# Patient Record
Sex: MALE | Race: WHITE | NOT HISPANIC OR LATINO | Employment: UNEMPLOYED | URBAN - METROPOLITAN AREA
[De-identification: names, ages, dates, MRNs, and addresses within clinical notes are randomized per-mention and may not be internally consistent; named-entity substitution may affect disease eponyms.]

---

## 2022-03-08 ENCOUNTER — APPOINTMENT (INPATIENT)
Dept: CT IMAGING | Facility: HOSPITAL | Age: 32
DRG: 897 | End: 2022-03-08
Payer: COMMERCIAL

## 2022-03-08 ENCOUNTER — HOSPITAL ENCOUNTER (INPATIENT)
Facility: HOSPITAL | Age: 32
LOS: 3 days | Discharge: HOME | DRG: 897 | End: 2022-03-11
Attending: EMERGENCY MEDICINE | Admitting: EMERGENCY MEDICINE
Payer: COMMERCIAL

## 2022-03-08 ENCOUNTER — APPOINTMENT (EMERGENCY)
Dept: CT IMAGING | Facility: HOSPITAL | Age: 32
End: 2022-03-08
Payer: COMMERCIAL

## 2022-03-08 ENCOUNTER — APPOINTMENT (EMERGENCY)
Dept: RADIOLOGY | Facility: HOSPITAL | Age: 32
End: 2022-03-08
Payer: COMMERCIAL

## 2022-03-08 ENCOUNTER — HOSPITAL ENCOUNTER (EMERGENCY)
Facility: HOSPITAL | Age: 32
Discharge: OTHER INSTITUTION | End: 2022-03-08
Attending: EMERGENCY MEDICINE | Admitting: EMERGENCY MEDICINE
Payer: COMMERCIAL

## 2022-03-08 VITALS
TEMPERATURE: 97.8 F | HEART RATE: 119 BPM | RESPIRATION RATE: 24 BRPM | WEIGHT: 153.88 LBS | DIASTOLIC BLOOD PRESSURE: 86 MMHG | OXYGEN SATURATION: 96 % | SYSTOLIC BLOOD PRESSURE: 148 MMHG

## 2022-03-08 DIAGNOSIS — F10.20 ALCOHOL USE DISORDER, SEVERE, DEPENDENCE (HCC): ICD-10-CM

## 2022-03-08 DIAGNOSIS — R56.9 SEIZURE (HCC): Primary | ICD-10-CM

## 2022-03-08 DIAGNOSIS — R74.01 TRANSAMINITIS: ICD-10-CM

## 2022-03-08 DIAGNOSIS — F10.231 DELIRIUM TREMENS (HCC): Primary | ICD-10-CM

## 2022-03-08 DIAGNOSIS — R56.9 ALCOHOL WITHDRAWAL SEIZURE (HCC): ICD-10-CM

## 2022-03-08 DIAGNOSIS — E87.2 LACTIC ACIDOSIS: ICD-10-CM

## 2022-03-08 DIAGNOSIS — F10.239 ALCOHOL WITHDRAWAL SEIZURE (HCC): ICD-10-CM

## 2022-03-08 PROBLEM — F32.9 MAJOR DEPRESSIVE DISORDER: Status: ACTIVE | Noted: 2022-03-08

## 2022-03-08 PROBLEM — R74.8 ABNORMAL SERUM LEVEL OF LIPASE: Status: ACTIVE | Noted: 2022-03-08

## 2022-03-08 LAB
2HR DELTA HS TROPONIN: 2 NG/L
ALBUMIN SERPL BCP-MCNC: 4.1 G/DL (ref 3.5–5)
ALBUMIN SERPL BCP-MCNC: 4.2 G/DL (ref 3–5.2)
ALP SERPL-CCNC: 118 U/L (ref 43–122)
ALP SERPL-CCNC: 153 U/L (ref 46–116)
ALT SERPL W P-5'-P-CCNC: 263 U/L
ALT SERPL W P-5'-P-CCNC: 358 U/L (ref 12–78)
AMPHETAMINES SERPL QL SCN: NEGATIVE
ANION GAP SERPL CALCULATED.3IONS-SCNC: 30 MMOL/L (ref 4–13)
ANION GAP SERPL CALCULATED.3IONS-SCNC: 8 MMOL/L (ref 5–14)
APAP SERPL-MCNC: <2 UG/ML (ref 10–20)
APTT PPP: 25 SECONDS (ref 23–37)
AST SERPL W P-5'-P-CCNC: 498 U/L (ref 17–59)
AST SERPL W P-5'-P-CCNC: 566 U/L (ref 5–45)
ATRIAL RATE: 121 BPM
BARBITURATES UR QL: POSITIVE
BASE EX.OXY STD BLDV CALC-SCNC: 82.1 % (ref 60–80)
BASE EXCESS BLDV CALC-SCNC: 0.6 MMOL/L
BASOPHILS # BLD AUTO: 0.1 THOUSANDS/ΜL (ref 0–0.1)
BASOPHILS NFR BLD AUTO: 1 % (ref 0–1)
BENZODIAZ UR QL: NEGATIVE
BETA-HYDROXYBUTYRATE: 1.9 MMOL/L
BILIRUB DIRECT SERPL-MCNC: 0.88 MG/DL (ref 0–0.2)
BILIRUB SERPL-MCNC: 2.16 MG/DL
BILIRUB SERPL-MCNC: 2.18 MG/DL (ref 0.2–1)
BUN SERPL-MCNC: 13 MG/DL (ref 5–25)
BUN SERPL-MCNC: 14 MG/DL (ref 5–25)
CALCIUM SERPL-MCNC: 7.7 MG/DL (ref 8.4–10.2)
CALCIUM SERPL-MCNC: 9.1 MG/DL (ref 8.3–10.1)
CARDIAC TROPONIN I PNL SERPL HS: 16 NG/L
CARDIAC TROPONIN I PNL SERPL HS: 18 NG/L
CHLORIDE SERPL-SCNC: 93 MMOL/L (ref 100–108)
CHLORIDE SERPL-SCNC: 97 MMOL/L (ref 97–108)
CK MB SERPL-MCNC: 1.2 % (ref 0–2.5)
CK MB SERPL-MCNC: 1.4 % (ref 0–2.5)
CK MB SERPL-MCNC: 5 NG/ML (ref 0–2.4)
CK MB SERPL-MCNC: 7.3 NG/ML (ref 0–5)
CK SERPL-CCNC: 434 U/L (ref 55–170)
CK SERPL-CCNC: 534 U/L (ref 39–308)
CO2 SERPL-SCNC: 15 MMOL/L (ref 21–32)
CO2 SERPL-SCNC: 26 MMOL/L (ref 22–30)
COCAINE UR QL: NEGATIVE
CREAT SERPL-MCNC: 0.5 MG/DL (ref 0.7–1.5)
CREAT SERPL-MCNC: 0.84 MG/DL (ref 0.6–1.3)
EOSINOPHIL # BLD AUTO: 0.01 THOUSAND/ΜL (ref 0–0.61)
EOSINOPHIL NFR BLD AUTO: 0 % (ref 0–6)
ERYTHROCYTE [DISTWIDTH] IN BLOOD BY AUTOMATED COUNT: 14.7 % (ref 11.6–15.1)
ERYTHROCYTE [DISTWIDTH] IN BLOOD BY AUTOMATED COUNT: 16.9 %
ETHANOL SERPL-MCNC: <3 MG/DL (ref 0–3)
FLUAV RNA RESP QL NAA+PROBE: NEGATIVE
FLUBV RNA RESP QL NAA+PROBE: NEGATIVE
GFR SERPL CREATININE-BSD FRML MDRD: 117 ML/MIN/1.73SQ M
GFR SERPL CREATININE-BSD FRML MDRD: 145 ML/MIN/1.73SQ M
GLUCOSE SERPL-MCNC: 114 MG/DL (ref 70–99)
GLUCOSE SERPL-MCNC: 216 MG/DL (ref 65–140)
HCO3 BLDV-SCNC: 25 MMOL/L (ref 24–30)
HCT VFR BLD AUTO: 34.6 % (ref 41–53)
HCT VFR BLD AUTO: 41.4 % (ref 36.5–49.3)
HGB BLD-MCNC: 12 G/DL (ref 13.5–17.5)
HGB BLD-MCNC: 13.8 G/DL (ref 12–17)
IMM GRANULOCYTES # BLD AUTO: 0.17 THOUSAND/UL (ref 0–0.2)
IMM GRANULOCYTES NFR BLD AUTO: 2 % (ref 0–2)
INR PPP: 0.94 (ref 0.84–1.19)
LACTATE SERPL-SCNC: 1.4 MMOL/L (ref 0.7–2)
LACTATE SERPL-SCNC: 16.6 MMOL/L (ref 0.5–2)
LIPASE SERPL-CCNC: 440 U/L (ref 73–393)
LIPASE SERPL-CCNC: 888 U/L (ref 23–300)
LYMPHOCYTES # BLD AUTO: 0.28 THOUSAND/UL (ref 0.5–4)
LYMPHOCYTES # BLD AUTO: 1.23 THOUSANDS/ΜL (ref 0.6–4.47)
LYMPHOCYTES # BLD AUTO: 4 % (ref 25–45)
LYMPHOCYTES NFR BLD AUTO: 13 % (ref 14–44)
MAGNESIUM SERPL-MCNC: 2 MG/DL (ref 1.6–2.6)
MAGNESIUM SERPL-MCNC: 2.1 MG/DL (ref 1.6–2.3)
MCH RBC QN AUTO: 31.3 PG (ref 26.8–34.3)
MCH RBC QN AUTO: 31.7 PG (ref 26–34)
MCHC RBC AUTO-ENTMCNC: 33.3 G/DL (ref 31.4–37.4)
MCHC RBC AUTO-ENTMCNC: 34.5 G/DL (ref 31–36)
MCV RBC AUTO: 92 FL (ref 80–100)
MCV RBC AUTO: 94 FL (ref 82–98)
METHADONE UR QL: NEGATIVE
MONOCYTES # BLD AUTO: 0.96 THOUSAND/ΜL (ref 0.17–1.22)
MONOCYTES # BLD AUTO: 1.07 THOUSAND/UL (ref 0.2–0.9)
MONOCYTES NFR BLD AUTO: 10 % (ref 4–12)
MONOCYTES NFR BLD AUTO: 15 % (ref 1–10)
NEUTROPHILS # BLD AUTO: 7.3 THOUSANDS/ΜL (ref 1.85–7.62)
NEUTS SEG # BLD: 5.75 THOUSAND/UL (ref 1.8–7.8)
NEUTS SEG NFR BLD AUTO: 74 % (ref 43–75)
NEUTS SEG NFR BLD AUTO: 81 %
NRBC BLD AUTO-RTO: 0 /100 WBCS
O2 CT BLDV-SCNC: 14.8 ML/DL
OPIATES UR QL SCN: NEGATIVE
OXYCODONE+OXYMORPHONE UR QL SCN: NEGATIVE
P AXIS: 74 DEGREES
PCO2 BLDV: 39.3 MM HG (ref 42–50)
PCP UR QL: NEGATIVE
PH BLDV: 7.42 [PH] (ref 7.3–7.4)
PLATELET # BLD AUTO: 131 THOUSANDS/UL (ref 149–390)
PLATELET # BLD AUTO: 74 THOUSANDS/UL (ref 150–450)
PLATELET BLD QL SMEAR: ABNORMAL
PMV BLD AUTO: 7.7 FL (ref 8.9–12.7)
PMV BLD AUTO: 9.8 FL (ref 8.9–12.7)
PO2 BLDV: 49.9 MM HG (ref 35–45)
POTASSIUM SERPL-SCNC: 4.5 MMOL/L (ref 3.6–5)
POTASSIUM SERPL-SCNC: 5.2 MMOL/L (ref 3.5–5.3)
PR INTERVAL: 142 MS
PROT SERPL-MCNC: 7.2 G/DL (ref 5.9–8.4)
PROT SERPL-MCNC: 7.8 G/DL (ref 6.4–8.2)
PROTHROMBIN TIME: 12.1 SECONDS (ref 11.6–14.5)
QRS AXIS: 37 DEGREES
QRSD INTERVAL: 82 MS
QT INTERVAL: 318 MS
QTC INTERVAL: 451 MS
RBC # BLD AUTO: 3.77 MILLION/UL (ref 4.5–5.9)
RBC # BLD AUTO: 4.41 MILLION/UL (ref 3.88–5.62)
RBC MORPH BLD: NORMAL
RSV RNA RESP QL NAA+PROBE: NEGATIVE
SALICYLATES SERPL-MCNC: <3 MG/DL (ref 3–20)
SARS-COV-2 RNA RESP QL NAA+PROBE: NEGATIVE
SODIUM SERPL-SCNC: 131 MMOL/L (ref 137–147)
SODIUM SERPL-SCNC: 138 MMOL/L (ref 136–145)
T WAVE AXIS: 60 DEGREES
THC UR QL: NEGATIVE
TOTAL CELLS COUNTED SPEC: 100
TSH SERPL DL<=0.05 MIU/L-ACNC: 4.02 UIU/ML (ref 0.36–3.74)
VENTRICULAR RATE: 121 BPM
WBC # BLD AUTO: 7.1 THOUSAND/UL (ref 4.5–11)
WBC # BLD AUTO: 9.77 THOUSAND/UL (ref 4.31–10.16)

## 2022-03-08 PROCEDURE — 70450 CT HEAD/BRAIN W/O DYE: CPT

## 2022-03-08 PROCEDURE — 85025 COMPLETE CBC W/AUTO DIFF WBC: CPT | Performed by: EMERGENCY MEDICINE

## 2022-03-08 PROCEDURE — 36415 COLL VENOUS BLD VENIPUNCTURE: CPT | Performed by: EMERGENCY MEDICINE

## 2022-03-08 PROCEDURE — 96366 THER/PROPH/DIAG IV INF ADDON: CPT

## 2022-03-08 PROCEDURE — HZ2ZZZZ DETOXIFICATION SERVICES FOR SUBSTANCE ABUSE TREATMENT: ICD-10-PCS | Performed by: EMERGENCY MEDICINE

## 2022-03-08 PROCEDURE — 84443 ASSAY THYROID STIM HORMONE: CPT | Performed by: EMERGENCY MEDICINE

## 2022-03-08 PROCEDURE — 85730 THROMBOPLASTIN TIME PARTIAL: CPT | Performed by: EMERGENCY MEDICINE

## 2022-03-08 PROCEDURE — 82010 KETONE BODYS QUAN: CPT | Performed by: PHYSICIAN ASSISTANT

## 2022-03-08 PROCEDURE — 80143 DRUG ASSAY ACETAMINOPHEN: CPT | Performed by: EMERGENCY MEDICINE

## 2022-03-08 PROCEDURE — 83605 ASSAY OF LACTIC ACID: CPT | Performed by: EMERGENCY MEDICINE

## 2022-03-08 PROCEDURE — 96376 TX/PRO/DX INJ SAME DRUG ADON: CPT

## 2022-03-08 PROCEDURE — 96365 THER/PROPH/DIAG IV INF INIT: CPT

## 2022-03-08 PROCEDURE — 83690 ASSAY OF LIPASE: CPT | Performed by: PHYSICIAN ASSISTANT

## 2022-03-08 PROCEDURE — 82550 ASSAY OF CK (CPK): CPT | Performed by: PHYSICIAN ASSISTANT

## 2022-03-08 PROCEDURE — 96368 THER/DIAG CONCURRENT INF: CPT

## 2022-03-08 PROCEDURE — 93005 ELECTROCARDIOGRAM TRACING: CPT

## 2022-03-08 PROCEDURE — 96372 THER/PROPH/DIAG INJ SC/IM: CPT

## 2022-03-08 PROCEDURE — 85027 COMPLETE CBC AUTOMATED: CPT | Performed by: PHYSICIAN ASSISTANT

## 2022-03-08 PROCEDURE — 85610 PROTHROMBIN TIME: CPT | Performed by: EMERGENCY MEDICINE

## 2022-03-08 PROCEDURE — 85007 BL SMEAR W/DIFF WBC COUNT: CPT | Performed by: PHYSICIAN ASSISTANT

## 2022-03-08 PROCEDURE — 83690 ASSAY OF LIPASE: CPT | Performed by: EMERGENCY MEDICINE

## 2022-03-08 PROCEDURE — 80307 DRUG TEST PRSMV CHEM ANLYZR: CPT | Performed by: PHYSICIAN ASSISTANT

## 2022-03-08 PROCEDURE — 71045 X-RAY EXAM CHEST 1 VIEW: CPT

## 2022-03-08 PROCEDURE — G1004 CDSM NDSC: HCPCS

## 2022-03-08 PROCEDURE — 70486 CT MAXILLOFACIAL W/O DYE: CPT

## 2022-03-08 PROCEDURE — 96375 TX/PRO/DX INJ NEW DRUG ADDON: CPT

## 2022-03-08 PROCEDURE — 82550 ASSAY OF CK (CPK): CPT | Performed by: EMERGENCY MEDICINE

## 2022-03-08 PROCEDURE — 83605 ASSAY OF LACTIC ACID: CPT | Performed by: PHYSICIAN ASSISTANT

## 2022-03-08 PROCEDURE — 83735 ASSAY OF MAGNESIUM: CPT | Performed by: PHYSICIAN ASSISTANT

## 2022-03-08 PROCEDURE — 0241U HB NFCT DS VIR RESP RNA 4 TRGT: CPT | Performed by: EMERGENCY MEDICINE

## 2022-03-08 PROCEDURE — 82805 BLOOD GASES W/O2 SATURATION: CPT | Performed by: PHYSICIAN ASSISTANT

## 2022-03-08 PROCEDURE — 82553 CREATINE MB FRACTION: CPT | Performed by: PHYSICIAN ASSISTANT

## 2022-03-08 PROCEDURE — 74177 CT ABD & PELVIS W/CONTRAST: CPT

## 2022-03-08 PROCEDURE — 96361 HYDRATE IV INFUSION ADD-ON: CPT

## 2022-03-08 PROCEDURE — 80053 COMPREHEN METABOLIC PANEL: CPT | Performed by: PHYSICIAN ASSISTANT

## 2022-03-08 PROCEDURE — 93010 ELECTROCARDIOGRAM REPORT: CPT | Performed by: INTERNAL MEDICINE

## 2022-03-08 PROCEDURE — 80048 BASIC METABOLIC PNL TOTAL CA: CPT | Performed by: EMERGENCY MEDICINE

## 2022-03-08 PROCEDURE — 99291 CRITICAL CARE FIRST HOUR: CPT | Performed by: PHYSICIAN ASSISTANT

## 2022-03-08 PROCEDURE — 80076 HEPATIC FUNCTION PANEL: CPT | Performed by: EMERGENCY MEDICINE

## 2022-03-08 PROCEDURE — 82553 CREATINE MB FRACTION: CPT | Performed by: EMERGENCY MEDICINE

## 2022-03-08 PROCEDURE — 82077 ASSAY SPEC XCP UR&BREATH IA: CPT | Performed by: EMERGENCY MEDICINE

## 2022-03-08 PROCEDURE — 99291 CRITICAL CARE FIRST HOUR: CPT

## 2022-03-08 PROCEDURE — 99291 CRITICAL CARE FIRST HOUR: CPT | Performed by: EMERGENCY MEDICINE

## 2022-03-08 PROCEDURE — 80179 DRUG ASSAY SALICYLATE: CPT | Performed by: EMERGENCY MEDICINE

## 2022-03-08 PROCEDURE — 83735 ASSAY OF MAGNESIUM: CPT | Performed by: EMERGENCY MEDICINE

## 2022-03-08 PROCEDURE — 84484 ASSAY OF TROPONIN QUANT: CPT | Performed by: EMERGENCY MEDICINE

## 2022-03-08 PROCEDURE — 72125 CT NECK SPINE W/O DYE: CPT

## 2022-03-08 RX ORDER — ASCORBIC ACID, VITAMIN A PALMITATE, CHOLECALCIFEROL, THIAMINE HYDROCHLORIDE, RIBOFLAVIN-5 PHOSPHATE SODIUM, PYRIDOXINE HYDROCHLORIDE, NIACINAMIDE, DEXPANTHENOL, ALPHA-TOCOPHEROL ACETATE, VITAMIN K1, FOLIC ACID, BIOTIN, CYANOCOBALAMIN 200; 3300; 200; 6; 3.6; 6; 40; 15; 10; 150; 600; 60; 5 MG/10ML; [IU]/10ML; [IU]/10ML; MG/10ML; MG/10ML; MG/10ML; MG/10ML; MG/10ML; [IU]/10ML; UG/10ML; UG/10ML; UG/10ML; UG/10ML
10 INJECTION, SOLUTION INTRAVENOUS ONCE
Status: DISCONTINUED | OUTPATIENT
Start: 2022-03-08 | End: 2022-03-08

## 2022-03-08 RX ORDER — LORAZEPAM 2 MG/ML
2 INJECTION INTRAMUSCULAR ONCE
Status: COMPLETED | OUTPATIENT
Start: 2022-03-08 | End: 2022-03-08

## 2022-03-08 RX ORDER — PHENOBARBITAL SODIUM 130 MG/ML
130 INJECTION INTRAMUSCULAR ONCE
Status: COMPLETED | OUTPATIENT
Start: 2022-03-08 | End: 2022-03-08

## 2022-03-08 RX ORDER — ACETAMINOPHEN 325 MG/1
650 TABLET ORAL EVERY 6 HOURS PRN
Status: DISCONTINUED | OUTPATIENT
Start: 2022-03-08 | End: 2022-03-11 | Stop reason: HOSPADM

## 2022-03-08 RX ORDER — ONDANSETRON 2 MG/ML
4 INJECTION INTRAMUSCULAR; INTRAVENOUS EVERY 6 HOURS PRN
Status: DISCONTINUED | OUTPATIENT
Start: 2022-03-08 | End: 2022-03-11 | Stop reason: HOSPADM

## 2022-03-08 RX ORDER — LORAZEPAM 2 MG/ML
1 INJECTION INTRAMUSCULAR
Status: DISCONTINUED | OUTPATIENT
Start: 2022-03-08 | End: 2022-03-08 | Stop reason: HOSPADM

## 2022-03-08 RX ORDER — DEXTROSE AND SODIUM CHLORIDE 5; .9 G/100ML; G/100ML
125 INJECTION, SOLUTION INTRAVENOUS CONTINUOUS
Status: DISCONTINUED | OUTPATIENT
Start: 2022-03-08 | End: 2022-03-08

## 2022-03-08 RX ORDER — TRAZODONE HYDROCHLORIDE 50 MG/1
50 TABLET ORAL
Status: DISCONTINUED | OUTPATIENT
Start: 2022-03-08 | End: 2022-03-11 | Stop reason: HOSPADM

## 2022-03-08 RX ORDER — LORAZEPAM 2 MG/ML
4 INJECTION INTRAMUSCULAR ONCE
Status: COMPLETED | OUTPATIENT
Start: 2022-03-08 | End: 2022-03-08

## 2022-03-08 RX ORDER — MAGNESIUM SULFATE HEPTAHYDRATE 40 MG/ML
2 INJECTION, SOLUTION INTRAVENOUS ONCE
Status: COMPLETED | OUTPATIENT
Start: 2022-03-08 | End: 2022-03-08

## 2022-03-08 RX ORDER — SODIUM CHLORIDE 9 MG/ML
125 INJECTION, SOLUTION INTRAVENOUS CONTINUOUS
Status: DISCONTINUED | OUTPATIENT
Start: 2022-03-08 | End: 2022-03-11 | Stop reason: HOSPADM

## 2022-03-08 RX ADMIN — LORAZEPAM 1 MG: 2 INJECTION INTRAMUSCULAR; INTRAVENOUS at 14:04

## 2022-03-08 RX ADMIN — SODIUM CHLORIDE 1000 ML: 9 INJECTION, SOLUTION INTRAVENOUS at 12:05

## 2022-03-08 RX ADMIN — SODIUM CHLORIDE 125 ML/HR: 9 INJECTION, SOLUTION INTRAVENOUS at 18:45

## 2022-03-08 RX ADMIN — SODIUM CHLORIDE 1000 ML: 0.9 INJECTION, SOLUTION INTRAVENOUS at 12:42

## 2022-03-08 RX ADMIN — PHENOBARBITAL SODIUM 130 MG: 130 INJECTION INTRAMUSCULAR at 22:17

## 2022-03-08 RX ADMIN — THIAMINE HYDROCHLORIDE 500 MG: 100 INJECTION, SOLUTION INTRAMUSCULAR; INTRAVENOUS at 20:00

## 2022-03-08 RX ADMIN — PHENOBARBITAL SODIUM 130 MG: 130 INJECTION INTRAMUSCULAR at 22:59

## 2022-03-08 RX ADMIN — MAGNESIUM SULFATE 2 G: 2 INJECTION INTRAVENOUS at 12:13

## 2022-03-08 RX ADMIN — PHENOBARBITAL SODIUM 130 MG: 130 INJECTION INTRAMUSCULAR at 18:01

## 2022-03-08 RX ADMIN — PHENOBARBITAL SODIUM 130 MG: 130 INJECTION INTRAMUSCULAR at 19:53

## 2022-03-08 RX ADMIN — PHENOBARBITAL SODIUM 650 MG: 130 INJECTION INTRAMUSCULAR; INTRAVENOUS at 13:15

## 2022-03-08 RX ADMIN — LORAZEPAM 2 MG: 2 INJECTION INTRAMUSCULAR; INTRAVENOUS at 12:02

## 2022-03-08 RX ADMIN — SODIUM CHLORIDE 1000 ML: 0.9 INJECTION, SOLUTION INTRAVENOUS at 17:15

## 2022-03-08 RX ADMIN — LORAZEPAM 4 MG: 2 INJECTION INTRAMUSCULAR; INTRAVENOUS at 14:43

## 2022-03-08 RX ADMIN — LORAZEPAM 1 MG: 2 INJECTION INTRAMUSCULAR; INTRAVENOUS at 12:47

## 2022-03-08 RX ADMIN — LORAZEPAM 1 MG: 2 INJECTION INTRAMUSCULAR; INTRAVENOUS at 13:08

## 2022-03-08 RX ADMIN — Medication 650 MG: at 17:16

## 2022-03-08 RX ADMIN — THIAMINE HYDROCHLORIDE: 100 INJECTION, SOLUTION INTRAMUSCULAR; INTRAVENOUS at 12:41

## 2022-03-08 RX ADMIN — LORAZEPAM 1 MG: 2 INJECTION INTRAMUSCULAR; INTRAVENOUS at 12:22

## 2022-03-08 RX ADMIN — LORAZEPAM 2 MG: 2 INJECTION INTRAMUSCULAR; INTRAVENOUS at 12:03

## 2022-03-08 RX ADMIN — IOHEXOL 100 ML: 350 INJECTION, SOLUTION INTRAVENOUS at 18:19

## 2022-03-08 NOTE — H&P
HISTORY & PHYSICAL EXAM  DEPARTMENT OF MEDICAL TOXICOLOGY  LEVEL 4 MEDICAL DETOX UNIT  Sloan Fitzgerald 29 y o  male MRN: [id number]  Unit/Bed#: 5T [facility] 504-01 Encounter: [id number]      Reason for Admission/Principal Problem: Ethanol withdrawal, Ethanol use disorder  Admitting Provider: Saintclair Reach, PA-C  Attending Provider: Chad Holley DO   3/8/2022  4:25 PM        Alcohol withdrawal syndrome with complication [facility])  Assessment & Plan  · Patient presented as a transfer from [facility] ED for alcohol withdrawal  · Presenting symptoms includes agitation, confusion, tremors, palpitations and witnessed seizure at home  · On arrival to the detox unit, patient was calm and non combative but appeared agitated with noticeable tremors  · Patient admits history of daily alcohol use preferably vodka  Admits to drinking 1 gal of vodka daily  · Last drank 2:00 p m on 03/07/2022  · Admits tried quitting alcohol use with resulting seizure  · Patient unable to provide information currently  · Was given 8 mg of Ativan IV, 2 g of magnesium, 650 mg of phenobarbital and 2 L of normal saline prior to transfer to the detox unit  · Will initiate SEWS protocol  Soft upper extremity restraint was placed due to patient's inability to follow commands and not risk to himself  · Will place on Telemetry and continue to monitor  Consider Precedex if no improvement    Alcoholic ketoacidosis  Assessment & Plan  · Transfer to the detox unit with an anion gap of 30, CO2 of 15, and lactate of 16 6  · Lactic acidosis likely multifactorial from dehydration due to alcohol consumption  worsened by his seizure  · Patient currently responds to name calling with eye opening  · Patient received 2 L of normal saline prior to transfer to the detox unit    · Upon arrival patient still sinus tachycardic with a rate of 121 beats per minute with no ischemic changes  · Additional 1000 mL of normal saline was initiated and a repeat blood work ordered  · Repeat blood work came back with pH of 7 42, lactate of 1 4, CO2 of 26, anion gap of 8 and a beta hydroxy 1 9 and a serum glucose of 114  · Will place patient on 125 mL normal saline and will repeat morning labs  D5NS was not initiated due to patient's improving lactic acidosis and elevated serum glucose    Alcohol use disorder, severe, dependence (HCC)  Assessment & Plan  · Admits daily alcohol use for several years  · Associated new onset seizure from alcohol cessation  · Admits daily consumption of 1 gal of vodka per day; last drank vodka at 2:00 p m 03/07/2022  · No known history of detox/rehab treatment in the past  · Will consult case management for disposition planning    Seizure [facility])  Assessment & Plan  · Patient with known history of Daily Vodka consumption  · Witnessed generalized tonic-clonic seizure per reports upon alcohol cessation  · No known history of seizures or alcohol withdrawal seizure  · Imaging studies obtained prior to transfer: CT scan of the head was negative for any bleed with no aspiration on chest x-ray  · No active seizure currently  · Exam positive for sequela from his seizure activity with noticeable dry oral and nasal blood with partial avulsion of tooth number 26 with grimace to palpation  Nonbleeding abrasion to the tongue was appreciated  · No appreciable TMJ dislocation or tenderness on palpation of the facial bones including the jaw and cervical spine  · Will continue SEWs protocol and initiate seizure precaution protocol  · Will obtain a CT scan of the face and cervical spine    Transaminitis  Assessment & Plan  · Abnormal liver function test in the setting of chronic alcohol consumption  · Transfer lab workup was notable for an AST of 566, ALT of 358, alkaline phosphatase of 533, total bilirubin of 2 18 and a direct bilirubin of 0 88     · A repeat blood work upon arrival to the detox unit showed improving liver function test: , ALT of 263, alkaline phosphatase 118, total bilirubin of 2 16   · Will continue to monitor for improvement  A m  Lab was ordered    Major depressive disorder  Assessment & Plan  · History of depression currently on Effexor  · Will hold off on Effexor until improvement of patient's ETOH    Abnormal serum level of lipase  Assessment & Plan  · Likely a sequela from alcohol consumption  · No grimace on palpation of the abdomen  · Presenting lipase was 440 and a repeat upon arrival to the detox facility was 880  · Will obtain a CT scan of the abdomen and pelvis  · Will monitor a m  Lipase level               VTE Prophylaxis: Enoxaparin (Lovenox)  / sequential compression device   Code Status: Full Code      Anticipated Length of Stay:  Patient will be admitted on an Inpatient basis with an anticipated length of stay of  More than 2  midnights  Justification for Hospital Stay:  Alcohol withdrawal syndrome with complication    For any questions or concerns, please Tiger Text the advanced practitioner in the role of [facility]-DETOX-AP On Call      This patient qualifies for Level IV medically managed intensive inpatient services under the criteria set by the American Society of Addiction Medicine, including dimensions 1-3  The patient is in withdrawal (or is intoxicated with high risk of withdrawal), with severe and unstable medical and/or psychiatric (dual diagnosis) problems, requiring requires 24-hour medical and nursing care and the full resources of a [address] patient to medical detox unit and continue supportive care and stabilization of acute ethanol withdrawal per medical toxicology/detox treatment pathway  Monitor ethanol withdrawal severity via the Severity of Ethanol Withdrawal Scale (SEWS) Q4 hours and then hourly if/when SEWS > 6  Treat withdrawal per pathway and reassess Q30-60 minutes             Mild SEWS Score 1-6  Administer medications* (IV or PO; PO preferred):   If initial SEWS score: diazepam 10mg PO/IV x 1 AND phenobarbital 65 mg PO/IV x 1   If repeat SEWS score 1-6: phenobarbital 65 mg PO/IV q1 hour x 5 doses maximum   Reassessment:    SEWS q1 hour after each dose until SEWS 0 x 2 hours   VS q1 hours (until SEWS 0, then q4 hours)   Notify provider for bedside evaluation if 5-dose maximum is reached, RASS of -3 to -5, or SEWS score escalates to moderate or severe  Moderate SEWS Score 7-12  Administer medications* (IV):   If initial SEWS score: diazepam 10mg IV x 1 AND phenobarbital 260 mg IV x 1   If repeat SEWS score 7-12 or score escalated from mild: phenobarbital 130 mg IV q30 minutes x 5 doses maximum   Reassessment:   SEWS q30 minutes after each dose until SEWS < 7 (then hourly until SEWS 0 x 2 hours)   VS q30 minutes until SEWS < 7 (then hourly until SEWS 0, then q4 hours)   Notify provider for bedside evaluation if 5-dose maximum is reached, RASS of -3 to -5, or SEWS score escalates to severe  Severe SEWS Score ? 13  Administer medications* (IV):   If initial SEWS score: Diazepam 10 mg IV x 1 AND phenobarbital 650 mg IV piggyback x 1 over 15-30 minutes   If repeat SEWS score ? 13 or score escalated from mild or moderate: phenobarbital 130 mg IV q30 minutes x 5 doses maximum   Reassessment:   SEWS q30 minutes after each dose until SEWS < 7 (then hourly until SEWS 0 x 2 hours)    VS q30 minutes until SEWS < 7 (then hourly until SEWS 0, then q4 hours)   Notify provider for bedside evaluation if 5-dose maximum is reached or RASS of -3 to -5   *Hold medications and notify provider if CNS depression, respirations < 10/min, or RASS of -3 to -5           Medications to be administered adjunctively if more than 2 grams of phenobarbital is needed for stabilization of withdrawal; require attending approval     Dexmedetomidine infusion 0 1-1mcg/kg/hr IV infusion, titratable to reduced agitation (Goal: RASS -2)   Ketamine   o Acute agitated delirium: 1-2 mg/kg IV or 4-5 mg/kg IM  o Refractory withdrawal: 0 1-1mg/kg/hr IV infusion, titratable to reduced agitation (Goal: RASS -2)    Further evaluation, screening and treatment:  Evaluate complete metabolic panel, transaminases, INR, and lipase  Assess hepatic ultrasound for any sign of alcoholic liver disease or cirrhosis, and ultimately refer for further hepatic evaluation and care as/if indicated  Additional medications for ethanol associated malnutrition: Thiamine 100 mg IV daily, increase to 500 mg TID for signs/symptoms of Wernicke's Encephalopathy or Wernicke Korsakoff Syndrome   Folic acid 1 mg IV daily   Multivitamin PO daily      Will offer first monthly injection of Naltrexone 380 mg IM, once patient is stabilized, as it has been shown to assist in decreasing cravings for ethanol  Evaluate and treat for coexisting substance use, such as opioids and nicotine  Discuss risk factors for infectious disease, such as history of intravenous drug abuse, and offer hepatitis and HIV screening if indicated  Case management consultation to assist with coordination of subsequent treatment after discharge  Hx and PE limited by: Altered Mental status from Alcohol withdrawal symptoms    HPI: Wallene Fleischer is a 29y o  year old male with a history of depression currently on Effexor who presented to the detox facility as a transfer from [address] [place] [address] ED for evaluation for alcohol withdrawal symptoms  According to the report obtained from the transferring facility, patient is significant other called EMS after witnessing what appears to be a tonic clonic seizure lasting less than 5 minutes following on active attempt to stop consuming alcohol  Patient admits to daily consumption of 1 gal of vodka, last consumed at 2:00 p m  on 03/07/2022  Admits no known history of detox/rehab treatment for alcohol    Patient initially presented to the ED agitated, confused, no reciprocal tremors, and anxious  Patient had dried blood overlying his nostril and mild with no obvious trauma appreciated per EMR report  Patient was given a total of 8 mg of IV Ativan, 2 g of magnesium, and 2 L of normal saline  Chest x-ray was obtained which showed no aspiration and a CT scan of the head obtained showed no blood  Basic blood work obtained showed an anion gap of 30, CO2 of 15, lactate of 16 6, AST of 566, ALT of 358, alkaline phosphatase of 153 and total bilirubin of 2 18  Case was discussed with the on-call  Dr Timothy Cooley who recommended administering 650 mg of phenobarbital prior to transfer to the detox unit  Upon arrival, patient is somnolent but responds to name calling  Patient is slightly agitated with obvious tremors  Patient unable to provide any history due to his somnolent state  Preferred alcoholic beverage(s): Vodka  Quantity and frequency of alcohol intake: 1 gallon daily  Use of any ethanol substitutes (toxic alcohols): no  Date/Time of last alcohol intake: 2pm 03/07/2022  Current signs and symptoms of ethanol withdrawal: anxiety, tremor, tachycardia, seizures and disorientation    SEWS Total Score: 8 (3/8/2022  5:50 PM)      Ethanol Withdrawal History  Previous ethanol withdrawal? no  Prior inpatient treatment for ethanol withdrawal? no  Prior outpatient treatment for ethanol withdrawal? no  History of seizures with prior ethanol withdrawal? no  Prior treatment with naltrexone (Vivitrol)? no  Current treatment with naltrexone (Vivitrol)? no  Other current treatment for ethanol use disorder?  no  Co-existing substance use? yes    Review of PDMP: no     Social History     Substance and Sexual Activity   Alcohol Use Yes    Comment: 1 gallon of vodka per day     Social History     Substance and Sexual Activity   Drug Use Not Currently    Types: Heroin     Social History     Tobacco Use   Smoking Status Never Smoker   Smokeless Tobacco Never Used       Review of Systems   Unable to perform ROS: Mental status change   HENT: Negative for congestion and rhinorrhea  Gastrointestinal: Negative for diarrhea and vomiting  Psychiatric/Behavioral: Positive for agitation and confusion  The patient is nervous/anxious  Historical Information   Past Medical History:   Diagnosis Date    Alcoholism ([facility] [address] )     Hypertension     Psychiatric disorder      History reviewed  No pertinent surgical history  History reviewed  No pertinent family history  Social History   Marital Status: Single   Occupation: unknown  Patient Pre-hospital Living Situation: Lives with significant other  Patient Pre-hospital Level of Mobility: Fully functional  Patient Pre-hospital Diet Restrictions: None    No Known Allergies    Prior to Admission medications    Not on File       Current Facility-Administered Medications   Medication Dose Route Frequency    acetaminophen (TYLENOL) tablet 650 mg  650 mg Oral Q6H PRN    [START ON 3/9/2022] enoxaparin (LOVENOX) subcutaneous injection 40 mg  40 mg Subcutaneous Daily    [START ON 5/0/5184] folic acid 1 mg, thiamine (VITAMIN B1) 100 mg in sodium chloride 0 9 % 100 mL IV piggyback   Intravenous Daily    [START ON 3/9/2022] multivitamin-minerals (CENTRUM) tablet 1 tablet  1 tablet Oral Daily    ondansetron (ZOFRAN) injection 4 mg  4 mg Intravenous Q6H PRN    sodium chloride 0 9 % infusion  125 mL/hr Intravenous Continuous    traZODone (DESYREL) tablet 50 mg  50 mg Oral HS PRN       Continuous Infusions:sodium chloride, 125 mL/hr             Objective       Intake/Output Summary (Last 24 hours) at 3/8/2022 1829  Last data filed at 3/8/2022 1715  Gross per 24 hour   Intake --   Output 700 ml   Net -700 ml       Invasive Devices:   Peripheral IV 03/08/22 Right Antecubital (Active)   Site Assessment WDL;Dry;Clean; Intact 03/08/22 1700   Dressing Type Transparent 03/08/22 1700   Line Status Blood return noted; Flushed;Saline locked 03/08/22 1700   Dressing Status Clean;Dry; Intact 03/08/22 1700   Dressing Change Due 03/12/22 03/08/22 1700   Reason Not Rotated Not due 03/08/22 1700       Peripheral IV 03/08/22 Right Hand (Active)   Site Assessment WDL; Clean;Dry; Intact 03/08/22 1700   Dressing Type Transparent 03/08/22 1700   Line Status Blood return noted; Flushed; Infusing 03/08/22 1700   Dressing Status Clean;Dry; Intact 03/08/22 1700   Dressing Change Due 03/12/22 03/08/22 1700   Reason Not Rotated Not due 03/08/22 1700       External Urinary Catheter Medium (Active)   Collection Container Standard drainage bag 03/08/22 1715   Interventions Removed and skin assessed; Pericare performed 03/08/22 1715       Vitals   Vitals:    03/08/22 1637 03/08/22 1749   BP: 164/95 135/72   TempSrc: Temporal Temporal   Pulse: (!) 123 (!) 112   Resp: 20 20   Patient Position - Orthostatic VS: Lying Lying   Temp: 99 5 °F (37 5 °C) 100 °F (37 8 °C)       Physical Exam  Constitutional:       General: He is not in acute distress  Appearance: He is not toxic-appearing  HENT:      Head: Normocephalic and atraumatic  Right Ear: Tympanic membrane, ear canal and external ear normal  There is no impacted cerumen  Left Ear: Tympanic membrane, ear canal and external ear normal  There is no impacted cerumen  Nose: No congestion or rhinorrhea  Comments: Dried blood appreciated to be nasal os with no evidence of a bony deformity  No septal hematoma appreciated  Mouth/Throat:      Comments: Laxity to tooth number 26 with dry blood around the lips no facial bone deformity  Eyes:      Extraocular Movements: Extraocular movements intact  Conjunctiva/sclera: Conjunctivae normal       Pupils: Pupils are equal, round, and reactive to light  Cardiovascular:      Rate and Rhythm: Regular rhythm  Tachycardia present  Pulmonary:      Effort: Pulmonary effort is normal  No respiratory distress  Breath sounds: Normal breath sounds   No stridor  No wheezing or rhonchi  Abdominal:      General: Abdomen is flat  Bowel sounds are normal  There is no distension  Palpations: Abdomen is soft  Tenderness: There is no abdominal tenderness  Musculoskeletal:         General: No swelling or tenderness  Cervical back: Normal range of motion and neck supple  No rigidity or tenderness  Skin:     Capillary Refill: Capillary refill takes less than 2 seconds  Neurological:      GCS: GCS eye subscore is 4  GCS verbal subscore is 5  GCS motor subscore is 6  Motor: Tremor present  No seizure activity  Gait: Gait abnormal       Comments: Ataxia on finger-to-nose           Data:    EKG, Pathology, and Other Studies: I have personally reviewed pertinent reports  EKG:  EKG obtained at 5:26 p m  03/08/2022 showed a sinus tachycardia with an age undetermined septal infarct with a ventricular rate of 121 beats per minute, LA interval of 142ms, QRS duration of 82ms with no ischemic changes      Lab Results:  CBC ETOH     Lab Results   Component Value Date    WBC 7 10 03/08/2022    RBC 3 77 (L) 03/08/2022    HGB 12 0 (L) 03/08/2022    HCT 34 6 (L) 03/08/2022    MCV 92 03/08/2022    MCH 31 7 03/08/2022    MCHC 34 5 03/08/2022    RDW 16 9 (H) 03/08/2022    PLT 74 (L) 03/08/2022    MPV 7 7 (L) 03/08/2022      Lab Results   Component Value Date    LACTICACID 1 4 03/08/2022      CMP UA         Component Value Date/Time    K 4 5 03/08/2022 1708    CL 97 03/08/2022 1708    CO2 26 03/08/2022 1708    BUN 13 03/08/2022 1708    CREATININE 0 50 (L) 03/08/2022 1708         Component Value Date/Time    CALCIUM 7 7 (L) 03/08/2022 1708    ALKPHOS 118 03/08/2022 1708     (H) 03/08/2022 1708     (H) 03/08/2022 1708      No results found for: CLARITYU, COLORU, SPECGRAV, PHUR, GLUCOSEU, KETONESU, BLOODU, PROTEIN UA, NITRITE, BILIRUBINUR, UROBILINOGEN, LEUKOCYTESUR, WBCUA, RBCUA, HYALINE, BACTERIA, EPIS     Liver Function Test: ASA     Lab Results Component Value Date    TBILI 2 16 (H) 03/08/2022    BILIDIR 0 88 (H) 03/08/2022    ALKPHOS 118 03/08/2022     (H) 03/08/2022     (H) 03/08/2022    TP 7 2 03/08/2022    ALB 4 2 03/08/2022      Lab Results   Component Value Date    SALICYLATE <3 (L) 28/50/2915      Troponin APAP     No results found for: TROPONINI   Lab Results   Component Value Date    ACTMNPHEN <2 0 (L) 03/08/2022      VBG HCG     Lab Results   Component Value Date/Time    PHVEN 7 421 (H) 03/08/2022 05:08 PM    KII0LQN 39 3 (L) 03/08/2022 05:08 PM    PO2VEN 49 9 (H) 03/08/2022 05:08 PM    OKO3HED 25 0 03/08/2022 05:08 PM    BEVEN 0 6 03/08/2022 05:08 PM    L5MVERNYN 14 8 03/08/2022 05:08 PM    G2SKWXD 82 1 (H) 03/08/2022 05:08 PM      No results found for: HCGQUANT   ABG Urine Drug Screen     No results found for: PHART, GXK6WKH, PO2ART, CRY5LMW, BEART, A4JLJWBGL, O2HGB, SOURC, YAW, VTAC, ACRATE, INSPIREDAIR, PEEP   Lab Results   Component Value Date    AMPMETHUR Negative 03/08/2022    BARBTUR Positive (A) 03/08/2022    BDZUR Negative 03/08/2022    COCAINEUR Negative 03/08/2022    METHADONEUR Negative 03/08/2022    OPIATEUR Negative 03/08/2022    PCPUR Negative 03/08/2022    THCUR Negative 03/08/2022    OXYCODONEUR Negative 03/08/2022      Lactate INR     Lab Results   Component Value Date    LACTICACID 1 4 03/08/2022      Lab Results   Component Value Date    INR 0 94 03/08/2022      PTT Protime     Lab Results   Component Value Date/Time    PTT 25 03/08/2022 12:13 PM        Lab Results   Component Value Date/Time    PROTIME 12 1 03/08/2022 12:13 PM              Imaging Studies: I have personally reviewed pertinent reports  Counseling / Coordination of Care  Total floor / unit time spent today 45 minutes  Greater than 50% of total time was spent with the patient and / or family counseling and / or coordination of care         Minutes of critical care time 45  -Critical care time was exclusive of separately billable procedures and teaching time    -Critical care was necessary to treat or prevent imminent or life-threatening deterioration of the following condition: CNS failure/compromise, toxidrome (ethanol withdrawal),  withdrawal and metabolic crisis  -Critical care time was spent personally by me on the following activities as well as the above as per the course and rest of chart: obtaining history from patient/surrogate, development of a treatment plan, discussions with referring provider(s), evaluation of patient's response to the treatment, examination of the patient, performing treatments and interventions, re-evaluation of the patient's condition, review of old charts, ordering/interpreting laboratory studies, ordering/interpreting of radiographic studies  ** Please Note: This note has been constructed using a voice recognition system   **

## 2022-03-08 NOTE — ED PROVIDER NOTES
History  Chief Complaint   Patient presents with    Withdrawal - Alcohol     last drink 12 hours ago  ems called for withdrawl S/s  seizing on arrival  admitts to drinking 1 gallon of vodka/day       History provided by:  Patient  History limited by:  Acuity of condition   used: No    Withdrawal - Alcohol  Severity:  Severe  Onset quality:  Gradual  Duration:  1 day  Timing:  Constant  Progression:  Worsening  Chronicity:  New  Suspected agents:  Alcohol  Associated symptoms: agitation, confusion, loss of consciousness, palpitations and seizures    Associated symptoms: no abdominal pain, no nausea, no shortness of breath, no suicidal ideation and no vomiting    Risk factors: addiction treatment, mental illness, psychiatric hx and withdrawal syndrome    72-year-old male presenting from home via EMS for evaluation of alcohol withdrawal found to be seizing on arrival   Patient offers limited history due to the acuity of condition  The history from EMS is that this patient is a frequent alcohol abuser last drink was at 2:00 p m  Yesterday  Patient drinks vodka daily  Reportedly trying to stop develops symptoms overnight into today  Was found to be seizing on arrival to the emergency department with tongue laceration with bleeding noted  EMS did report some dry blood in the mouth prior to their assessment it sounds like the patient might have had additionally had a seizure at home  I later did speak with the patient's girlfriend and she confirms such  She does not note a prior seizure history in this patient  The seizure at home was the for seizure  The patient is prescribed Effexor but does not take this medication  The patient did have some improvement in his mental status after seizure resolved denies intentional overdose denies other substance abuse at this time  Denies trauma  Denies suicidal ideations  Does endorse hallucinations      None       Past Medical History:   Diagnosis Date    Alcoholism ([facility] [address] )     Hypertension     Psychiatric disorder        History reviewed  No pertinent surgical history  History reviewed  No pertinent family history  I have reviewed and agree with the history as documented  E-Cigarette/Vaping     E-Cigarette/Vaping Substances     Social History     Tobacco Use    Smoking status: Never Smoker    Smokeless tobacco: Never Used   Substance Use Topics    Alcohol use: Yes     Comment: 1 gallon of vodka per day    Drug use: Not Currently     Types: Heroin       Review of Systems   Constitutional: Negative for chills and fever  HENT: Negative for ear pain and sore throat  Eyes: Negative for pain and visual disturbance  Respiratory: Negative for cough and shortness of breath  Cardiovascular: Positive for palpitations  Negative for chest pain  Gastrointestinal: Negative for abdominal pain, nausea and vomiting  Genitourinary: Negative for dysuria and hematuria  Musculoskeletal: Negative for arthralgias and back pain  Skin: Negative for color change and rash  Neurological: Positive for tremors, seizures and loss of consciousness  Negative for syncope  Psychiatric/Behavioral: Positive for agitation and confusion  Negative for self-injury and suicidal ideas  The patient is nervous/anxious  All other systems reviewed and are negative  Physical Exam  Physical Exam  Vitals and nursing note reviewed  Exam conducted with a chaperone present  Constitutional:       General: He is in acute distress  Appearance: He is toxic-appearing  He is not ill-appearing  Comments: Actively seizing on arrival   After she has resolved patient awake and alert, tremulous  HENT:      Head: Normocephalic  Right Ear: External ear normal       Left Ear: External ear normal       Nose:      Comments: Evidence of recent dried blood in nares  Mouth/Throat:      Comments: Fresh blood in oropharynx no arterial hemorrhage    Tongue laceration noted   Not in need of immediate repair  Eyes:      Extraocular Movements: Extraocular movements intact  Conjunctiva/sclera: Conjunctivae normal       Pupils: Pupils are equal, round, and reactive to light  Cardiovascular:      Rate and Rhythm: Regular rhythm  Tachycardia present  Pulses: Normal pulses  Heart sounds: Normal heart sounds  Pulmonary:      Effort: Pulmonary effort is normal       Breath sounds: No stridor  No wheezing, rhonchi or rales  Abdominal:      General: Abdomen is flat  Tenderness: There is no abdominal tenderness  There is no guarding or rebound  Musculoskeletal:      Cervical back: Neck supple  Right lower leg: No edema  Left lower leg: No edema  Skin:     General: Skin is warm  Capillary Refill: Capillary refill takes less than 2 seconds  Neurological:      General: No focal deficit present  GCS: GCS eye subscore is 4  GCS verbal subscore is 5  GCS motor subscore is 6  Cranial Nerves: No facial asymmetry  Motor: Tremor, abnormal muscle tone and seizure activity present        Coordination: Coordination abnormal       Gait: Gait abnormal          Vital Signs  ED Triage Vitals [03/08/22 1159]   Temperature Pulse Respirations Blood Pressure SpO2   97 8 °F (36 6 °C) (!) 125 (!) 2 158/87 95 %      Temp Source Heart Rate Source Patient Position - Orthostatic VS BP Location FiO2 (%)   Tympanic Monitor -- Right arm --      Pain Score       --           Vitals:    03/08/22 1345 03/08/22 1400 03/08/22 1405 03/08/22 1410   BP: 151/80 163/99 163/99 155/98   Pulse: (!) 106 (!) 116 (!) 115 (!) 115         Visual Acuity  Visual Acuity      Most Recent Value   L Pupil Size (mm) 2   R Pupil Size (mm) 2          ED Medications  Medications   LORazepam (ATIVAN) injection 1 mg (1 mg Intravenous Given 3/8/22 1404)   LORazepam (ATIVAN) injection 2 mg (2 mg Intravenous Given 3/8/22 1203)   sodium chloride 0 9 % bolus 1,000 mL (0 mL Intravenous Stopped 3/8/22 1347)     Followed by   sodium chloride 0 9 % bolus 1,000 mL (1,000 mL Intravenous New Bag 3/8/22 1242)   LORazepam (ATIVAN) injection 2 mg (2 mg Intramuscular Given 3/8/22 1202)   magnesium sulfate 2 g/50 mL IVPB (premix) 2 g (0 g Intravenous Stopped 5/1/52 1135)   folic acid 1 mg, thiamine (VITAMIN B1) 100 mg in sodium chloride 0 9 % 100 mL IV piggyback ( Intravenous Stopped 3/8/22 1310)   PHENobarbital 130 mg/mL 650 mg in sodium chloride 0 9 % 100 mL IVPB (0 mg Intravenous Stopped 3/8/22 1355)       Diagnostic Studies  Results Reviewed     Procedure Component Value Units Date/Time    HS Troponin I 2hr [[id number]] Collected: 03/08/22 1415    Lab Status: In process Specimen: Blood from Arm, Right Updated: 03/08/22 1418    HS Troponin I 4hr [[id number]]     Lab Status: No result Specimen: Blood     Lactic acid [[id number]]  (Abnormal) Collected: 03/08/22 1213    Lab Status: Final result Specimen: Blood Updated: 03/08/22 1311     LACTIC ACID 16 6 mmol/L     Narrative:      Result may be elevated if tourniquet was used during collection      Lactic acid 2 Hours [[id number]]     Lab Status: No result Specimen: Blood     CKMB [[id number]]  (Abnormal) Collected: 03/08/22 1213    Lab Status: Final result Specimen: Blood Updated: 03/08/22 1310     CK-MB Index 1 4 %      CK-MB 7 3 ng/mL     Basic metabolic panel [[id number]]  (Abnormal) Collected: 03/08/22 1213    Lab Status: Final result Specimen: Blood Updated: 03/08/22 1309     Sodium 138 mmol/L      Potassium 5 2 mmol/L      Chloride 93 mmol/L      CO2 15 mmol/L      ANION GAP 30 mmol/L      BUN 14 mg/dL      Creatinine 0 84 mg/dL      Glucose 216 mg/dL      Calcium 9 1 mg/dL      eGFR 117 ml/min/1 73sq m     Narrative:      [place] guidelines for Chronic Kidney Disease (CKD):     Stage 1 with normal or high GFR (GFR > 90 mL/min/1 73 square meters)    Stage 2 Mild CKD (GFR = 60-89 mL/min/1 73 square meters)    Stage 3A Moderate CKD (GFR = 45-59 mL/min/1 73 square meters)    Stage 3B Moderate CKD (GFR = 30-44 mL/min/1 73 square meters)    Stage 4 Severe CKD (GFR = 15-29 mL/min/1 73 square meters)    Stage 5 End Stage CKD (GFR <15 mL/min/1 73 square meters)  Note: GFR calculation is accurate only with a steady state creatinine    Ethanol [[id number]]  (Normal) Collected: 03/08/22 1213    Lab Status: Final result Specimen: Blood Updated: 03/08/22 1302     Ethanol Lvl <3 mg/dL     COVID/FLU/RSV - 2 hour TAT [[id number]]  (Normal) Collected: 03/08/22 1212    Lab Status: Final result Specimen: Nasopharyngeal Swab Updated: 03/08/22 1254     SARS-CoV-2 Negative     INFLUENZA A PCR Negative     INFLUENZA B PCR Negative     RSV PCR Negative    Narrative:      FOR PEDIATRIC PATIENTS - copy/paste COVID Guidelines URL to browser: [email or web address]  [email or web address]    SARS-CoV-2 assay is a Nucleic Acid Amplification assay intended for the  qualitative detection of nucleic acid from SARS-CoV-2 in nasopharyngeal  swabs  Results are for the presumptive identification of SARS-CoV-2 RNA  Positive results are indicative of infection with SARS-CoV-2, the virus  causing COVID-19, but do not rule out bacterial infection or co-infection  with other viruses  Laboratories within the [place] and its  territories are required to report all positive results to the appropriate  public health authorities  Negative results do not preclude SARS-CoV-2  infection and should not be used as the sole basis for treatment or other  patient management decisions  Negative results must be combined with  clinical observations, patient history, and epidemiological information  This test has not been FDA cleared or approved  This test has been authorized by FDA under an Emergency Use Authorization  (EUA)   This test is only authorized for the duration of time the  declaration that circumstances exist justifying the authorization of the  emergency use of an in vitro diagnostic tests for detection of SARS-CoV-2  virus and/or diagnosis of COVID-19 infection under section 564(b)(1) of  the Act, 21 U  S C  857GIK-6(J)(2), unless the authorization is terminated  or revoked sooner  The test has been validated but independent review by FDA  and CLIA is pending  Test performed using [employer]: This RT-PCR assay targets N2,  a region unique to SARS-CoV-2  A conserved region in the E-gene was chosen  for pan-Sarbecovirus detection which includes SARS-CoV-2  Hepatic function panel [[id number]]  (Abnormal) Collected: 03/08/22 1213    Lab Status: Final result Specimen: Blood Updated: 03/08/22 1250     Total Bilirubin 2 18 mg/dL      Bilirubin, Direct 0 88 mg/dL      Alkaline Phosphatase 153 U/L       U/L       U/L      Total Protein 7 8 g/dL      Albumin 4 1 g/dL     TSH [[id number]]  (Abnormal) Collected: 03/08/22 1213    Lab Status: Final result Specimen: Blood Updated: 03/08/22 1250     TSH 3RD GENERATON 4 020 uIU/mL     Narrative:      Patients undergoing fluorescein dye angiography may retain small amounts of fluorescein in the body for 48-72 hours post procedure  Samples containing fluorescein can produce falsely depressed TSH values  If the patient had this procedure,a specimen should be resubmitted post fluorescein clearance        Magnesium [[id number]]  (Normal) Collected: 03/08/22 1213    Lab Status: Final result Specimen: Blood Updated: 03/08/22 1250     Magnesium 2 0 mg/dL     Lipase [[id number]]  (Abnormal) Collected: 03/08/22 1213    Lab Status: Final result Specimen: Blood Updated: 03/08/22 1250     Lipase 440 u/L     CK Total with Reflex CKMB [[id number]]  (Abnormal) Collected: 03/08/22 1213    Lab Status: Final result Specimen: Blood Updated: 03/08/22 1250     Total  U/L     Salicylate level [[id number]]  (Abnormal) Collected: 03/08/22 1213    Lab Status: Final result Specimen: Blood Updated: 03/08/22 1250 Salicylate Lvl <3 mg/dL     Acetaminophen level-If concentration is detectable, please discuss with medical  on call  [[id number]]  (Abnormal) Collected: 03/08/22 1213    Lab Status: Final result Specimen: Blood Updated: 03/08/22 1250     Acetaminophen Level <2 0 ug/mL     HS Troponin 0hr (reflex protocol) [[id number]]  (Normal) Collected: 03/08/22 1213    Lab Status: Final result Specimen: Blood Updated: 03/08/22 1238     hs TnI 0hr 16 ng/L     Protime-INR [[id number]]  (Normal) Collected: 03/08/22 1213    Lab Status: Final result Specimen: Blood Updated: 03/08/22 1224     Protime 12 1 seconds      INR 0 94    APTT [[id number]]  (Normal) Collected: 03/08/22 1213    Lab Status: Final result Specimen: Blood Updated: 03/08/22 1224     PTT 25 seconds     CBC and differential [[id number]]  (Abnormal) Collected: 03/08/22 1214    Lab Status: Final result Specimen: Blood Updated: 03/08/22 1224     WBC 9 77 Thousand/uL      RBC 4 41 Million/uL      Hemoglobin 13 8 g/dL      Hematocrit 41 4 %      MCV 94 fL      MCH 31 3 pg      MCHC 33 3 g/dL      RDW 14 7 %      MPV 9 8 fL      Platelets 377 Thousands/uL      nRBC 0 /100 WBCs      Neutrophils Relative 74 %      Immat GRANS % 2 %      Lymphocytes Relative 13 %      Monocytes Relative 10 %      Eosinophils Relative 0 %      Basophils Relative 1 %      Neutrophils Absolute 7 30 Thousands/µL      Immature Grans Absolute 0 17 Thousand/uL      Lymphocytes Absolute 1 23 Thousands/µL      Monocytes Absolute 0 96 Thousand/µL      Eosinophils Absolute 0 01 Thousand/µL      Basophils Absolute 0 10 Thousands/µL     Rapid drug screen, urine [[id number]]     Lab Status: No result Specimen: Urine     UA w Reflex to Microscopic w Reflex to Culture [[id number]]     Lab Status: No result Specimen: Urine, Clean Catch                  CT head without contrast   Final Result by Andria Cotto MD (03/08 1326)      No evidence of acute intracranial process  Workstation performed: [id number]         XR chest 1 view portable   Final Result by Navi Merritt DO (03/08 1246)      No acute cardiopulmonary disease  Workstation performed: [id number]                    Procedures  CriticalCare Time  Performed by: Iraj Sen DO  Authorized by: Iraj Sen DO     Critical care provider statement:     Critical care time (minutes):  39    Critical care time was exclusive of:  Separately billable procedures and treating other patients and teaching time    Critical care was necessary to treat or prevent imminent or life-threatening deterioration of the following conditions:  Toxidrome, metabolic crisis and CNS failure or compromise    Critical care was time spent personally by me on the following activities:  Obtaining history from patient or surrogate, discussions with consultants, development of treatment plan with patient or surrogate, discussions with primary provider, evaluation of patient's response to treatment, examination of patient, review of old charts, re-evaluation of patient's condition, ordering and review of radiographic studies, ordering and review of laboratory studies and ordering and performing treatments and interventions    I assumed direction of critical care for this patient from another provider in my specialty: no               ED Course  ED Course as of 03/08/22 1421   Tue Mar 08, 2022   1159 I was called to the bedside on arrival this patient arrives by EMS actively seizing  Evidence of oropharyngeal bleeding likely from tongue laceration  Patient unresponsive with generalized tonic-clonic activity lasting about a minute  Patient was treated with 2 mg of IM Ativan did seem to have improvement with his symptoms just prior to administration of the medication  He is confused but intermittently following commands  Airway is secure at this time no need for airway intervention    Will proceed with IV fluids full workup including toxicology, CT brain  1201 Patient remains tremulous however seizing has resolved  Will give additional 2 mg of I the Ativan for a total 4 mg of total Ativan given for this patient at this time  Will proceed with CT scan  1209 Message sent via tiger text to Toxicology for recs  Consult placed  1209 EKG interpreted by myself  EKG dated 03/22 at 12:02 p m  Demonstrates sinus tachycardia at 125 beats per minute, normal RI interval, normal QRS interval, top normal QTC interval, no STEMI  1216 IV vitamins, fluids, additonal benzos ordered  Patient stable at this time  Awake, alert, tremulous  1237 Respirations(!): 2  Charted in error, patient breathing appropriate rate   1239 hs TnI 0hr: 16   1242 Proceeding with 10mg/kg IV phenobarbital loading dose over 30min per tox recs  1306 TOTAL BILIRUBIN(!): 2 18   1306 BILIRUBIN DIRECT(!): 0 88   1306 Alkaline Phosphatase(!): 153   1306 AST(!): 566   1306 ALT(!): 358   1308 MEDICAL ALCOHOL: <3   6564 SALICYLATE LEVEL(!): <3   1308 ACETAMINOPHEN LEVEL(!): <2 0   1309 Significantly elevated lactic acid at 16 likely in the setting of witnessed seizure  Proceeding with IV fluids  Anion gap metabolic acidosis well likely in the setting of seizure increased metabolic activity alcohol withdrawal    1309 Anion Gap(!!): 30   1311 LACTIC ACID(!!): 16 6   1400 Discussing with Toxicology Dr Nadeen Price for next steps, may need transfer for medical detox unit                                                MDM  Number of Diagnoses or Management Options  Alcohol withdrawal seizure [facility]): new and requires workup  Seizure [facility]): new and requires workup  Transaminitis: new and requires workup     Amount and/or Complexity of Data Reviewed  Clinical lab tests: ordered and reviewed  Tests in the radiology section of CPT®: ordered and reviewed  Tests in the medicine section of CPT®: ordered and reviewed  Discussion of test results with the performing providers: yes  Decide to obtain previous medical records or to obtain history from someone other than the patient: yes  Obtain history from someone other than the patient: yes  Review and summarize past medical records: yes  Discuss the patient with other providers: yes  Independent visualization of images, tracings, or specimens: yes    Risk of Complications, Morbidity, and/or Mortality  Presenting problems: high  Diagnostic procedures: moderate  Management options: moderate    Patient Progress  Patient progress: stable  61-year-old male presenting with concerns for alcohol withdrawal seizure  Initial seizure resolved as 2 mg of Ativan was being given  Additional 2 mg given due to significant tremors, tachycardia, agitation  Bleeding from tongue laceration resolved without further intervention  Patient protecting his airway throughout the ER stay  Screening workup including multiple metabolic disturbances including lactic acidosis, anion gap acidosis, transaminitis/elevated LFTs all likely from the seizure  Ethanol is 0 the emergency department  Suspicion for alcohol withdrawal seizures high  Screening CT scan does not reveal other causes  He is afebrile  He is treated with multiple doses of IV Ativan proximally 5-6 mg total in the emergency department  The case was discussed with medical toxicology recommending IV loading dose of phenobarbital   I touch base with Toxicology after the workup patient is improved but with still symptoms elevated CIWA score  Will continue intermittent p r n  Benzos at this time  Accepted by Dr Vane Mendoza to the medical detox unit  Notified packs for transfer him tele and medical necessity signed  Spoke with transfer  Girlfriend and patient updated at the bedside  Father on the way  Informed of plan for transport in agreement         Disposition  Final diagnoses:   Seizure ([facility] [address] )   Alcohol withdrawal seizure ([facility] [address] )   Transaminitis   Lactic acidosis     Time reflects when diagnosis was documented in both MDM as applicable and the Disposition within this note     Time User Action Codes Description Comment    3/8/2022 11:59 AM Gwendloyn Frank Add [R56 9] Seizure ([facility] [address] )     3/8/2022 11:59 AM Gwendloyn Frank Add [F10 239,  R56 9] Alcohol withdrawal seizure ([facility] [address] )     3/8/2022  1:08 PM Gwendloyn Frank Add [R74 01] Transaminitis     3/8/2022  2:19 PM Gwendloyn Frank Add [E87 2] Lactic acidosis       ED Disposition     ED Disposition Condition Date/Time Comment    Transfer to Another Facility-In Network  Tue Mar 8, 2022  2:08 PM Tomie Ahumada should be transferred out to [facility]  Follow-up Information    None         Patient's Medications    No medications on file       No discharge procedures on file      PDMP Review     None          ED Provider  Electronically Signed by           Rachel Steiner DO  03/08/22 7967

## 2022-03-08 NOTE — ASSESSMENT & PLAN NOTE
· Admits daily alcohol use for several years  · Associated new onset withdrawal seizure from alcohol cessation  · Admits daily consumption of 1 gal of vodka per day; last drank vodka at 2:00 p m 03/07/2022  · No known history of detox/rehab treatment in the past  · continue high dose thiamine [id number] and Folic acid 1mg daily IV  · consult case management for disposition planning

## 2022-03-08 NOTE — ASSESSMENT & PLAN NOTE
· Initial CMP 3/8/2022 revealed anion gap of 30, CO2 of 15; lactate of 16 6     · Elevated AG metabolic acidosis likely multifactorial from AKA and lactic acidosis from seizure  · Repeat CMP this AM revealed AG 10, chloride 101  · Repeat Lactic acid 0 7 this AM  · Metabolic acidosis resolved

## 2022-03-08 NOTE — ED NOTES
Non violent restraints applied to pt to prevent injury to self and staff order obtained        Wallace Mckeon RN  03/08/22 1869

## 2022-03-08 NOTE — ED NOTES
Left arm restraint removed at this time  Pt apprears to be more calm and restful at this time  Significant other remains at bedside        Tejinder Dai RN  03/08/22 4173

## 2022-03-08 NOTE — PLAN OF CARE
Problem: SUBSTANCE USE/ABUSE  Goal: By discharge, will develop insight into their chemical dependency and sustain motivation to continue in recovery  Description: INTERVENTIONS:  - Attends all daily group sessions and scheduled AA groups  - Actively practices coping skills through participation in the therapeutic community and adherence to program rules  - Reviews and completes assignments from individual treatment plan  - Assist patient development of understanding of their personal cycle of addiction and relapse triggers  Outcome: Progressing  Goal: By discharge, patient will have ongoing treatment plan addressing chemical dependency  Description: INTERVENTIONS:  - Assist patient with resources and/or appointments for ongoing recovery based living  Outcome: Progressing     Problem: COPING  Goal: Pt/Family able to verbalize concerns and demonstrate effective coping strategies  Description: INTERVENTIONS:  - Assist patient/family to identify coping skills, available support systems and cultural and spiritual values  - Provide emotional support, including active listening and acknowledgement of concerns of patient and caregivers  - Reduce environmental stimuli, as able  - Provide patient education  - Assess for spiritual pain/suffering and initiate spiritual care, including notification of Pastoral Care or chirag based community as needed  - Assess effectiveness of coping strategies  Outcome: Progressing  Goal: Will report anxiety at manageable levels  Description: INTERVENTIONS:  - Administer medication as ordered  - Teach and encourage coping skills  - Provide emotional support  - Assess patient/family for anxiety and ability to cope  Outcome: Progressing     Problem: SUBSTANCE USE/ABUSE  Goal: By discharge, will develop insight into their chemical dependency and sustain motivation to continue in recovery  Description: INTERVENTIONS:  - Attends all daily group sessions and scheduled AA groups  - Actively practices coping skills through participation in the therapeutic community and adherence to program rules  - Reviews and completes assignments from individual treatment plan  - Assist patient development of understanding of their personal cycle of addiction and relapse triggers  Outcome: Progressing  Goal: By discharge, patient will have ongoing treatment plan addressing chemical dependency  Description: INTERVENTIONS:  - Assist patient with resources and/or appointments for ongoing recovery based living  Outcome: Progressing  Goal: Will have no detox symptoms and will verbalize plan for changing substance-related behavior  Description: INTERVENTIONS:  - Monitor physical status and assess for symptoms of withdrawal  - Administer medication as ordered  - Provide emotional support with 1 on 1 interaction with staff  - Encourage recovery focused program/ addiction education  - Assess for verbalization of changing behaviors related to substance abuse  - Initiate consults and referrals as appropriate (Case Management, Spiritual Care, etc )  Outcome: Progressing     Problem: SPIRITUAL CARE  Goal: Pt/Family able to move forward in process of forgiving self, others and/or higher power  Description: INTERVENTIONS:  - Assist patient with any spiritual needs/requests such as communion, confession, anointing, etc  - Explore guilt and help patient/family identify possible spiritual/cultural beliefs and values  - Explore possibilities of making amends & reconciliation with self, others, and/or a greater power  - Guide patient/family in identifying painful feelings  - Help patient explore and identify spiritual beliefs, cultural understandings or values that may help or hinder letting go of issue  - Help patient explore feelings of anger, bitterness, resentment, anxiety   Help patient/family identify and examine the situation in which these feelings are experienced  - Help patient/family identify destructive displacement of feelings onto other individuals  - Refer patient to formal counseling and/or to [facility] for further support as needed or per request  Outcome: Progressing  Goal: Patient feels balance and connection with others and/or higher power that empowers the self during times of loss, guilt and fear  Description: INTERVENTIONS:  - Create safety for patient through empathic presence and non-judgmental listening  - Encourage patient to explore his/her values, beliefs and/or spiritual images and practices  - Encourage use of breath work, imagery, meditation, relaxation, reiki to ease distress and provide healing  - Encourage use of cultural and spiritual celebrations and rituals  - Facilitate discussion that helps patient sort out spiritual concerns  - Help patient identify where meaning/hope/comfort & strength are in his/her life  - Refer patient to [facility] for assistance, as appropriate  - Respond to patient/family need for prayer/ritual/sacrament/ceremony  Outcome: Progressing     Problem: NEUROSENSORY - ADULT  Goal: Achieves stable or improved neurological status  Description: INTERVENTIONS  - Monitor and report changes in neurological status  - Monitor vital signs such as temperature, blood pressure, glucose, and any other labs ordered   - Initiate measures to prevent increased intracranial pressure  - Monitor for seizure activity and implement precautions if appropriate      Outcome: Progressing  Goal: Remains free of injury related to seizures activity  Description: INTERVENTIONS  - Maintain airway, patient safety  and administer oxygen as ordered  - Monitor patient for seizure activity, document and report duration and description of seizure to physician/advanced practitioner  - If seizure occurs,  ensure patient safety during seizure  - Reorient patient post seizure  - Seizure pads on all 4 side rails  - Instruct patient/family to notify RN of any seizure activity including if an aura is experienced  - Instruct patient/family to call for assistance with activity based on nursing assessment  - Administer anti-seizure medications if ordered    Outcome: Progressing  Goal: Achieves maximal functionality and self care  Description: INTERVENTIONS  - Monitor swallowing and airway patency with patient fatigue and changes in neurological status  - Encourage and assist patient to increase activity and self care     - Encourage visually impaired, hearing impaired and aphasic patients to use assistive/communication devices  Outcome: Progressing     Problem: METABOLIC, FLUID AND ELECTROLYTES - ADULT  Goal: Electrolytes maintained within normal limits  Description: INTERVENTIONS:  - Monitor labs and assess patient for signs and symptoms of electrolyte imbalances  - Administer electrolyte replacement as ordered  - Monitor response to electrolyte replacements, including repeat lab results as appropriate  - Instruct patient on fluid and nutrition as appropriate  Outcome: Progressing     Problem: MUSCULOSKELETAL - ADULT  Goal: Maintain or return mobility to safest level of function  Description: INTERVENTIONS:  - Assess patient's ability to carry out ADLs; assess patient's baseline for ADL function and identify physical deficits which impact ability to perform ADLs (bathing, care of mouth/teeth, toileting, grooming, dressing, etc )  - Assess/evaluate cause of self-care deficits   - Assess range of motion  - Assess patient's mobility  - Assess patient's need for assistive devices and provide as appropriate  - Encourage maximum independence but intervene and supervise when necessary  - Involve family in performance of ADLs  - Assess for home care needs following discharge   - Consider OT consult to assist with ADL evaluation and planning for discharge  - Provide patient education as appropriate  Outcome: Progressing  Goal: Maintain proper alignment of affected body part  Description: INTERVENTIONS:  - Support, maintain and protect limb and body alignment  - Provide patient/ family with appropriate education  Outcome: Progressing

## 2022-03-08 NOTE — EMTALA/ACUTE CARE TRANSFER
[facility] EMERGENCY DEPARTMENT  [address] [facility] [address]  Dept: [phone]      EMTALA TRANSFER CONSENT    NAME Nicholas Soto                                         1990                              MRN [id number]    I have been informed of my rights regarding examination, treatment, and transfer   by Dr Carmelina Grissom DO    Benefits:   benefits treatments intervention not available at this facility (medical detox unit)  Risks:   risks of decompensated illness in route injury sustained during transport or delays in care due to long transport times  Consent for Transfer:  I acknowledge that my medical condition has been evaluated and explained to me by the emergency department physician or other qualified medical person and/or my attending physician, who has recommended that I be transferred to the service of    at    The above potential benefits of such transfer, the potential risks associated with such transfer, and the probable risks of not being transferred have been explained to me, and I fully understand them  The doctor has explained that, in my case, the benefits of transfer outweigh the risks  I agree to be transferred  I authorize the performance of emergency medical procedures and treatments upon me in both transit and upon arrival at the receiving facility  Additionally, I authorize the release of any and all medical records to the receiving facility and request they be transported with me, if possible  I understand that the safest mode of transportation during a medical emergency is an ambulance and that the Hospital advocates the use of this mode of transport  Risks of traveling to the receiving facility by car, including absence of medical control, life sustaining equipment, such as oxygen, and medical personnel has been explained to me and I fully understand them      (CEFERINO CORRECT BOX BELOW)  [x  ]  I consent to the stated transfer and to be transported by ambulance/helicopter  [  ]  I consent to the stated transfer, but refuse transportation by ambulance and accept full responsibility for my transportation by car  I understand the risks of non-ambulance transfers and I exonerate the Hospital and its staff from any deterioration in my condition that results from this refusal     X___________________________________________    DATE  22  TIME________  Signature of patient or legally responsible individual signing on patient behalf           RELATIONSHIP TO PATIENT_________________________          Provider Certification    NAME Tata Dennis                                         1990                              MRN [id number]    A medical screening exam was performed on the above named patient  Based on the examination:    Condition Necessitating Transfer The primary encounter diagnosis was Seizure [facility])  Diagnoses of Alcohol withdrawal seizure ([facility] [address] ) and Transaminitis were also pertinent to this visit  Patient Condition:   fair    Reason for Transfer:   acute alcohol withdrawal syndrome with alcohol withdrawal seizure needing inpatient medical detox  Transfer Requirements: Facility     · Space available and qualified personnel available for treatment as acknowledged by  PACS  · Agreed to accept transfer and to provide appropriate medical treatment as acknowledged by Toxicology Dr Marisabel Starks          · Appropriate medical records of the examination and treatment of the patient are provided at the time of transfer   [address] [facility], [address] _______  · Transfer will be performed by qualified personnel from    and appropriate transfer equipment as required, including the use of necessary and appropriate life support measures      Provider Certification: I have examined the patient and explained the following risks and benefits of being transferred/refusing transfer to the patient/family:         Based on these reasonable risks and benefits to the patient and/or the unborn child(nereyda), and based upon the information available at the time of the patients examination, I certify that the medical benefits reasonably to be expected from the provision of appropriate medical treatments at another medical facility outweigh the increasing risks, if any, to the individuals medical condition, and in the case of labor to the unborn child, from effecting the transfer      X____________________________________________ DATE 03/08/22        TIME_______      ORIGINAL - SEND TO MEDICAL RECORDS   COPY - SEND WITH PATIENT DURING TRANSFER

## 2022-03-08 NOTE — ASSESSMENT & PLAN NOTE
· CT abd/pelvis 3/8/2022 revealed enlarged fatty liver, no ascites   · CMP this AM revealed elevated but down-trending LFTS  ·  -> 391  ·  -> 218  · Alk phos 153 -> 105  · Total bilirubin 2 18 -> 1 44   · Above findings 2/2 chronic alcohol consumption   · Continue to monitor LFTs, repeat CMP in AM  · Encourage alcohol cessation

## 2022-03-08 NOTE — ED NOTES
Pt talking incoherent and rambles about nonsense  hallucinations present        Martha Betancourt RN  03/08/22 7055

## 2022-03-08 NOTE — ED NOTES
Pt hallucinating and unable follow commands with poor safety awareness at this time  Pt pulling at tubes, line and monitoring devices at this time  Dr Kae Carney aware, orders to follow       Uriel Wood, DOC  03/08/22 7081

## 2022-03-08 NOTE — ASSESSMENT & PLAN NOTE
· Patient with known history of daily alcohol consumption (1 gallon vodka daily)  · Witnessed generalized tonic-clonic seizure per reports upon alcohol cessation  · No known previous history of seizures or alcohol withdrawal seizure  · Imaging studies obtained prior to transfer: CT scan of the head was negative for any bleed with no aspiration on chest x-ray  · CT cervical spine and CT facial bones obtained upon admission 3/8/2022- both negative for acute abnormalities  · Exam positive for sequela from his seizure activity with noticeable dry oral and nasal blood  · Continue seizure precautions   · Alcohol withdrawal management as below

## 2022-03-08 NOTE — ASSESSMENT & PLAN NOTE
· Initial lipase 440 in ED, increased to 888 on admission  · Repeat lipase this  (down-trending)  · Likely a sequela from alcohol consumption  · CT abd/pelvis: fatty liver, pancreas unremarkable, no calcified gallstones, no pericholecystic   · No grimace on palpation of the abdomen  · Routine monitoring

## 2022-03-08 NOTE — ED NOTES
Pt still has tremors and slurred speech significant other at bedside       Camilo Burgess, RN  03/08/22 1511

## 2022-03-08 NOTE — ASSESSMENT & PLAN NOTE
· Patient transferred from [address] [place] [address] for alcohol withdrawal on 3/8/2022  · received 8 mg of Ativan IV and 650 mg of phenobarbital PTA  · Patient with history of daily alcohol consumption (1 gal of vodka daily)  · Last drank 2:00 p m on 03/07/2022  · Attempted alcohol cessation resulting in withdrawal seizure  · Suspect complication of delirium tremens  · Continue Health system protocol for medically-assisted alcohol withdrawal  · Patient has thus far received 2080 mg phenobarbital  · Current symptoms include diaphoresis, tremor, disorientation   · precedex drip was initially initiated this morning, however BP decreased to 105/61 while patient remained diaphoretic, tremulous, and disoriented   · Will discontinue precedex and transition to ketamine infusion- start at 0 1 mg/kg/hr, max of 0 5 mg/kg/hr, titrate to goal RASS -2  · Update status to step-down 2  · Continue telemetry monitoring

## 2022-03-08 NOTE — ED NOTES
Pt actively seizing on arrival on ems stretcher  Pt bit tongue and blood noted to the mouth  Now has incomprehensible speech and agitation  Ativan given im and intravenously        Pam Keating RN  03/08/22 9551

## 2022-03-09 PROBLEM — E87.2 ALCOHOLIC KETOACIDOSIS: Status: RESOLVED | Noted: 2022-03-08 | Resolved: 2022-03-09

## 2022-03-09 PROBLEM — F10.239 ALCOHOL WITHDRAWAL SEIZURE (HCC): Status: ACTIVE | Noted: 2022-03-08

## 2022-03-09 PROBLEM — K70.10 ALCOHOLIC HEPATITIS WITHOUT ASCITES: Status: ACTIVE | Noted: 2022-03-08

## 2022-03-09 PROBLEM — D61.818 PANCYTOPENIA (HCC): Status: ACTIVE | Noted: 2022-03-09

## 2022-03-09 PROBLEM — K70.0 ALCOHOLIC FATTY LIVER: Status: ACTIVE | Noted: 2022-03-08

## 2022-03-09 PROBLEM — E87.1 HYPONATREMIA: Status: ACTIVE | Noted: 2022-03-09

## 2022-03-09 PROBLEM — E87.6 HYPOKALEMIA: Status: ACTIVE | Noted: 2022-03-09

## 2022-03-09 PROBLEM — F10.231 ALCOHOL WITHDRAWAL SYNDROME, WITH DELIRIUM (HCC): Status: ACTIVE | Noted: 2022-03-08

## 2022-03-09 PROBLEM — K76.0 HEPATIC STEATOSIS: Status: ACTIVE | Noted: 2022-03-08

## 2022-03-09 LAB
ALBUMIN SERPL BCP-MCNC: 3.9 G/DL (ref 3–5.2)
ALP SERPL-CCNC: 105 U/L (ref 43–122)
ALT SERPL W P-5'-P-CCNC: 218 U/L
ANION GAP SERPL CALCULATED.3IONS-SCNC: 10 MMOL/L (ref 5–14)
AST SERPL W P-5'-P-CCNC: 391 U/L (ref 17–59)
ATRIAL RATE: 125 BPM
BILIRUB SERPL-MCNC: 1.44 MG/DL
BUN SERPL-MCNC: 7 MG/DL (ref 5–25)
CALCIUM SERPL-MCNC: 7.1 MG/DL (ref 8.4–10.2)
CHLORIDE SERPL-SCNC: 101 MMOL/L (ref 97–108)
CO2 SERPL-SCNC: 22 MMOL/L (ref 22–30)
CREAT SERPL-MCNC: 0.4 MG/DL (ref 0.7–1.5)
ERYTHROCYTE [DISTWIDTH] IN BLOOD BY AUTOMATED COUNT: 16.8 %
GFR SERPL CREATININE-BSD FRML MDRD: 159 ML/MIN/1.73SQ M
GLUCOSE SERPL-MCNC: 68 MG/DL (ref 70–99)
HCT VFR BLD AUTO: 30.5 % (ref 41–53)
HGB BLD-MCNC: 10.4 G/DL (ref 13.5–17.5)
LACTATE SERPL-SCNC: 0.7 MMOL/L (ref 0.7–2)
LIPASE SERPL-CCNC: 434 U/L (ref 23–300)
MCH RBC QN AUTO: 31.5 PG (ref 26–34)
MCHC RBC AUTO-ENTMCNC: 34 G/DL (ref 31–36)
MCV RBC AUTO: 93 FL (ref 80–100)
P AXIS: 77 DEGREES
PLATELET # BLD AUTO: 55 THOUSANDS/UL (ref 150–450)
PMV BLD AUTO: 8.5 FL (ref 8.9–12.7)
POTASSIUM SERPL-SCNC: 3.3 MMOL/L (ref 3.6–5)
PR INTERVAL: 126 MS
PROT SERPL-MCNC: 7 G/DL (ref 5.9–8.4)
QRS AXIS: 59 DEGREES
QRSD INTERVAL: 86 MS
QT INTERVAL: 342 MS
QTC INTERVAL: 493 MS
RBC # BLD AUTO: 3.28 MILLION/UL (ref 4.5–5.9)
SODIUM SERPL-SCNC: 133 MMOL/L (ref 137–147)
T WAVE AXIS: 13 DEGREES
VENTRICULAR RATE: 125 BPM
WBC # BLD AUTO: 4 THOUSAND/UL (ref 4.5–11)

## 2022-03-09 PROCEDURE — 83690 ASSAY OF LIPASE: CPT | Performed by: PHYSICIAN ASSISTANT

## 2022-03-09 PROCEDURE — 85027 COMPLETE CBC AUTOMATED: CPT | Performed by: PHYSICIAN ASSISTANT

## 2022-03-09 PROCEDURE — 93010 ELECTROCARDIOGRAM REPORT: CPT | Performed by: INTERNAL MEDICINE

## 2022-03-09 PROCEDURE — 80053 COMPREHEN METABOLIC PANEL: CPT | Performed by: PHYSICIAN ASSISTANT

## 2022-03-09 PROCEDURE — 99233 SBSQ HOSP IP/OBS HIGH 50: CPT | Performed by: PHYSICIAN ASSISTANT

## 2022-03-09 PROCEDURE — NC001 PR NO CHARGE: Performed by: PHYSICIAN ASSISTANT

## 2022-03-09 PROCEDURE — 83605 ASSAY OF LACTIC ACID: CPT | Performed by: PHYSICIAN ASSISTANT

## 2022-03-09 RX ORDER — LOPERAMIDE HYDROCHLORIDE 2 MG/1
2 CAPSULE ORAL 4 TIMES DAILY PRN
Status: DISCONTINUED | OUTPATIENT
Start: 2022-03-09 | End: 2022-03-11 | Stop reason: HOSPADM

## 2022-03-09 RX ORDER — PHENOBARBITAL SODIUM 130 MG/ML
130 INJECTION INTRAMUSCULAR ONCE
Status: DISCONTINUED | OUTPATIENT
Start: 2022-03-09 | End: 2022-03-10

## 2022-03-09 RX ORDER — POTASSIUM CHLORIDE 14.9 MG/ML
20 INJECTION INTRAVENOUS ONCE
Status: COMPLETED | OUTPATIENT
Start: 2022-03-09 | End: 2022-03-09

## 2022-03-09 RX ORDER — LORAZEPAM 2 MG/ML
2 INJECTION INTRAMUSCULAR ONCE AS NEEDED
Status: DISCONTINUED | OUTPATIENT
Start: 2022-03-09 | End: 2022-03-11 | Stop reason: HOSPADM

## 2022-03-09 RX ORDER — PHENOBARBITAL SODIUM 130 MG/ML
130 INJECTION INTRAMUSCULAR ONCE
Status: COMPLETED | OUTPATIENT
Start: 2022-03-09 | End: 2022-03-09

## 2022-03-09 RX ORDER — DEXMEDETOMIDINE HYDROCHLORIDE 4 UG/ML
.1-.7 INJECTION, SOLUTION INTRAVENOUS
Status: DISCONTINUED | OUTPATIENT
Start: 2022-03-09 | End: 2022-03-09

## 2022-03-09 RX ADMIN — Medication 0.1 MG/KG/HR: at 10:18

## 2022-03-09 RX ADMIN — PHENOBARBITAL SODIUM 130 MG: 130 INJECTION INTRAMUSCULAR at 03:54

## 2022-03-09 RX ADMIN — ENOXAPARIN SODIUM 40 MG: 100 INJECTION SUBCUTANEOUS at 08:42

## 2022-03-09 RX ADMIN — SODIUM CHLORIDE 125 ML/HR: 9 INJECTION, SOLUTION INTRAVENOUS at 19:14

## 2022-03-09 RX ADMIN — PHENOBARBITAL SODIUM 130 MG: 130 INJECTION INTRAMUSCULAR at 01:42

## 2022-03-09 RX ADMIN — SODIUM CHLORIDE 125 ML/HR: 9 INJECTION, SOLUTION INTRAVENOUS at 01:39

## 2022-03-09 RX ADMIN — POTASSIUM CHLORIDE 20 MEQ: 14.9 INJECTION, SOLUTION INTRAVENOUS at 12:26

## 2022-03-09 RX ADMIN — SODIUM CHLORIDE 125 ML/HR: 9 INJECTION, SOLUTION INTRAVENOUS at 10:53

## 2022-03-09 RX ADMIN — THIAMINE HYDROCHLORIDE 500 MG: 100 INJECTION, SOLUTION INTRAMUSCULAR; INTRAVENOUS at 19:12

## 2022-03-09 RX ADMIN — FOLIC ACID 1 MG: 5 INJECTION, SOLUTION INTRAMUSCULAR; INTRAVENOUS; SUBCUTANEOUS at 08:45

## 2022-03-09 RX ADMIN — DEXMEDETOMIDINE HYDROCHLORIDE 0.1 MCG/KG/HR: 400 INJECTION INTRAVENOUS at 08:42

## 2022-03-09 RX ADMIN — LOPERAMIDE HYDROCHLORIDE 2 MG: 2 CAPSULE ORAL at 16:54

## 2022-03-09 RX ADMIN — THIAMINE HYDROCHLORIDE 500 MG: 100 INJECTION, SOLUTION INTRAMUSCULAR; INTRAVENOUS at 03:54

## 2022-03-09 RX ADMIN — THIAMINE HYDROCHLORIDE 500 MG: 100 INJECTION, SOLUTION INTRAMUSCULAR; INTRAVENOUS at 10:53

## 2022-03-09 NOTE — PLAN OF CARE
Problem: SUBSTANCE USE/ABUSE  Goal: By discharge, will develop insight into their chemical dependency and sustain motivation to continue in recovery  Description: INTERVENTIONS:  - Attends all daily group sessions and scheduled AA groups  - Actively practices coping skills through participation in the therapeutic community and adherence to program rules  - Reviews and completes assignments from individual treatment plan  - Assist patient development of understanding of their personal cycle of addiction and relapse triggers  Outcome: Progressing  Goal: By discharge, patient will have ongoing treatment plan addressing chemical dependency  Description: INTERVENTIONS:  - Assist patient with resources and/or appointments for ongoing recovery based living  Outcome: Progressing     Problem: COPING  Goal: Pt/Family able to verbalize concerns and demonstrate effective coping strategies  Description: INTERVENTIONS:  - Assist patient/family to identify coping skills, available support systems and cultural and spiritual values  - Provide emotional support, including active listening and acknowledgement of concerns of patient and caregivers  - Reduce environmental stimuli, as able  - Provide patient education  - Assess for spiritual pain/suffering and initiate spiritual care, including notification of Pastoral Care or chirag based community as needed  - Assess effectiveness of coping strategies  Outcome: Progressing  Goal: Will report anxiety at manageable levels  Description: INTERVENTIONS:  - Administer medication as ordered  - Teach and encourage coping skills  - Provide emotional support  - Assess patient/family for anxiety and ability to cope  Outcome: Progressing     Problem: SUBSTANCE USE/ABUSE  Goal: By discharge, will develop insight into their chemical dependency and sustain motivation to continue in recovery  Description: INTERVENTIONS:  - Attends all daily group sessions and scheduled AA groups  - Actively practices coping skills through participation in the therapeutic community and adherence to program rules  - Reviews and completes assignments from individual treatment plan  - Assist patient development of understanding of their personal cycle of addiction and relapse triggers  Outcome: Progressing  Goal: By discharge, patient will have ongoing treatment plan addressing chemical dependency  Description: INTERVENTIONS:  - Assist patient with resources and/or appointments for ongoing recovery based living  Outcome: Progressing  Goal: Will have no detox symptoms and will verbalize plan for changing substance-related behavior  Description: INTERVENTIONS:  - Monitor physical status and assess for symptoms of withdrawal  - Administer medication as ordered  - Provide emotional support with 1 on 1 interaction with staff  - Encourage recovery focused program/ addiction education  - Assess for verbalization of changing behaviors related to substance abuse  - Initiate consults and referrals as appropriate (Case Management, Spiritual Care, etc )  Outcome: Progressing     Problem: SPIRITUAL CARE  Goal: Pt/Family able to move forward in process of forgiving self, others and/or higher power  Description: INTERVENTIONS:  - Assist patient with any spiritual needs/requests such as communion, confession, anointing, etc  - Explore guilt and help patient/family identify possible spiritual/cultural beliefs and values  - Explore possibilities of making amends & reconciliation with self, others, and/or a greater power  - Guide patient/family in identifying painful feelings  - Help patient explore and identify spiritual beliefs, cultural understandings or values that may help or hinder letting go of issue  - Help patient explore feelings of anger, bitterness, resentment, anxiety   Help patient/family identify and examine the situation in which these feelings are experienced  - Help patient/family identify destructive displacement of feelings onto other individuals  - Refer patient to formal counseling and/or to [facility] for further support as needed or per request  Outcome: Progressing  Goal: Patient feels balance and connection with others and/or higher power that empowers the self during times of loss, guilt and fear  Description: INTERVENTIONS:  - Create safety for patient through empathic presence and non-judgmental listening  - Encourage patient to explore his/her values, beliefs and/or spiritual images and practices  - Encourage use of breath work, imagery, meditation, relaxation, reiki to ease distress and provide healing  - Encourage use of cultural and spiritual celebrations and rituals  - Facilitate discussion that helps patient sort out spiritual concerns  - Help patient identify where meaning/hope/comfort & strength are in his/her life  - Refer patient to [facility] for assistance, as appropriate  - Respond to patient/family need for prayer/ritual/sacrament/ceremony  Outcome: Progressing     Problem: NEUROSENSORY - ADULT  Goal: Achieves stable or improved neurological status  Description: INTERVENTIONS  - Monitor and report changes in neurological status  - Monitor vital signs such as temperature, blood pressure, glucose, and any other labs ordered   - Initiate measures to prevent increased intracranial pressure  - Monitor for seizure activity and implement precautions if appropriate      Outcome: Progressing  Goal: Remains free of injury related to seizures activity  Description: INTERVENTIONS  - Maintain airway, patient safety  and administer oxygen as ordered  - Monitor patient for seizure activity, document and report duration and description of seizure to physician/advanced practitioner  - If seizure occurs,  ensure patient safety during seizure  - Reorient patient post seizure  - Seizure pads on all 4 side rails  - Instruct patient/family to notify RN of any seizure activity including if an aura is experienced  - Instruct patient/family to call for assistance with activity based on nursing assessment  - Administer anti-seizure medications if ordered    Outcome: Progressing  Goal: Achieves maximal functionality and self care  Description: INTERVENTIONS  - Monitor swallowing and airway patency with patient fatigue and changes in neurological status  - Encourage and assist patient to increase activity and self care     - Encourage visually impaired, hearing impaired and aphasic patients to use assistive/communication devices  Outcome: Progressing     Problem: METABOLIC, FLUID AND ELECTROLYTES - ADULT  Goal: Electrolytes maintained within normal limits  Description: INTERVENTIONS:  - Monitor labs and assess patient for signs and symptoms of electrolyte imbalances  - Administer electrolyte replacement as ordered  - Monitor response to electrolyte replacements, including repeat lab results as appropriate  - Instruct patient on fluid and nutrition as appropriate  Outcome: Progressing     Problem: MUSCULOSKELETAL - ADULT  Goal: Maintain or return mobility to safest level of function  Description: INTERVENTIONS:  - Assess patient's ability to carry out ADLs; assess patient's baseline for ADL function and identify physical deficits which impact ability to perform ADLs (bathing, care of mouth/teeth, toileting, grooming, dressing, etc )  - Assess/evaluate cause of self-care deficits   - Assess range of motion  - Assess patient's mobility  - Assess patient's need for assistive devices and provide as appropriate  - Encourage maximum independence but intervene and supervise when necessary  - Involve family in performance of ADLs  - Assess for home care needs following discharge   - Consider OT consult to assist with ADL evaluation and planning for discharge  - Provide patient education as appropriate  Outcome: Progressing  Goal: Maintain proper alignment of affected body part  Description: INTERVENTIONS:  - Support, maintain and protect limb and body alignment  - Provide patient/ family with appropriate education  Outcome: Progressing     Problem: MOBILITY - ADULT  Goal: Maintain or return to baseline ADL function  Description: INTERVENTIONS:  -  Assess patient's ability to carry out ADLs; assess patient's baseline for ADL function and identify physical deficits which impact ability to perform ADLs (bathing, care of mouth/teeth, toileting, grooming, dressing, etc )  - Assess/evaluate cause of self-care deficits   - Assess range of motion  - Assess patient's mobility; develop plan if impaired  - Assess patient's need for assistive devices and provide as appropriate  - Encourage maximum independence but intervene and supervise when necessary  - Involve family in performance of ADLs  - Assess for home care needs following discharge   - Consider OT consult to assist with ADL evaluation and planning for discharge  - Provide patient education as appropriate  Outcome: Progressing  Goal: Maintains/Returns to pre admission functional level  Description: INTERVENTIONS:  - Perform BMAT or MOVE assessment daily    - Set and communicate daily mobility goal to care team and patient/family/caregiver  - Collaborate with rehabilitation services on mobility goals if consulted  - Perform Range of Motion 10 times a day  - Reposition patient every 2 hours  - Dangle patient 5 times a day  - Stand patient 5 times a day  - Ambulate patient 5 times a day  - Out of bed to chair 3 times a day   - Out of bed for meals 3 times a day  - Out of bed for toileting  - Record patient progress and toleration of activity level   Outcome: Progressing     Problem: Nutrition/Hydration-ADULT  Goal: Nutrient/Hydration intake appropriate for improving, restoring or maintaining nutritional needs  Description: Monitor and assess patient's nutrition/hydration status for malnutrition  Collaborate with interdisciplinary team and initiate plan and interventions as ordered    Monitor patient's weight and dietary intake as ordered or per policy  Utilize nutrition screening tool and intervene as necessary  Determine patient's food preferences and provide high-protein, high-caloric foods as appropriate       INTERVENTIONS:  - Monitor oral intake, urinary output, labs, and treatment plans  - Assess nutrition and hydration status and recommend course of action  - Evaluate amount of meals eaten  - Assist patient with eating if necessary   - Allow adequate time for meals  - Recommend/ encourage appropriate diets, oral nutritional supplements, and vitamin/mineral supplements  - Order, calculate, and assess calorie counts as needed  - Recommend, monitor, and adjust tube feedings and TPN/PPN based on assessed needs  - Assess need for intravenous fluids  - Provide specific nutrition/hydration education as appropriate  - Include patient/family/caregiver in decisions related to nutrition  Outcome: Progressing

## 2022-03-09 NOTE — NURSING NOTE
Received call from patient father  Obtained verbal permission from patient to speak with father generally   Per the father, patient has a court date Friday and his  will need paperwork proving he is admitted to get a continuance      name Santa Abraham  Phone [phone]  Fax: [phone]

## 2022-03-09 NOTE — PROGRESS NOTES
03/09/22 0959   Dexmedetomidine   Volume (mL) Dexmedetomidine 1 55 mL     Wasted 80mL of Precedex w/ Ruth Huntley RN

## 2022-03-09 NOTE — UTILIZATION REVIEW
Inpatient Admission Authorization Request   NOTIFICATION OF INPATIENT ADMISSION/INPATIENT AUTHORIZATION REQUEST   SERVICING FACILITY:   [address] [place] [address]  [address] , [facility], [address] [place] [address]  Tax ID: [id number]  NPI: [id number]  [facility]  [address]  Place of Service Code: 24     ATTENDING PROVIDER:  Attending Name and NPI#: Micaela Fraser [[id number]]  Address: [address] , [facility], [address] [place] [address]  Phone: [phone]     UTILIZATION REVIEW CONTACT:  [facility] Utilization   Network Utilization Review Department  Phone: [phone]  Fax [phone]  Email: [email or web address]     PHYSICIAN ADVISORY SERVICES:  FOR [id number] REVIEW - MEDICAL NECESSITY DENIAL  Phone: [phone]  Fax: [phone]  Email: [email or web address]     TYPE OF REQUEST:  Inpatient Status     ADMISSION INFORMATION:  ADMISSION DATE/TIME: 3/8/22  4:25 PM  PATIENT DIAGNOSIS CODE/DESCRIPTION:  Alcohol withdrawal ([address] ) [F10 239]  DISCHARGE DATE/TIME: No discharge date for patient encounter  IMPORTANT INFORMATION:  Please contact the [facility] directly with any questions or concerns regarding this request  Department voicemails are confidential     Send requests for admission clinical reviews, concurrent reviews, approvals, and administrative denials due to lack of clinical to fax [phone]

## 2022-03-09 NOTE — NURSING NOTE
Patient asked this RN if one of his soft wrist restraints could be removed  After assessing patient, myself and EMMA Fontaine agreed that removing the left restraint would be appropriate  Patient will be monitored

## 2022-03-09 NOTE — ASSESSMENT & PLAN NOTE
· CMP this AM revealed mild hypokalemia with K+ 3 3  · Administer supplementation IV  · Continue to monitor electrolytes and replete as necessary

## 2022-03-09 NOTE — CASE MANAGEMENT
Cm informed by nursing staff that pt was currently receiving Ketamine and was not capable of completing intake

## 2022-03-09 NOTE — QUICK NOTE
MEDICAL DETOX UNIT, LEVEL 4  Department of Medical Toxicology      Reason for Admission/Principal Problem: alcohol withdrawal with seizure, alcohol use disorder  Reassessing Provider: Mami Alanis PA-C  3/8/2022  4:25 PM    03/09/22  0134    Patient is currently on SEWS protocol  I have reevaluated the patient  He has received 1820 mg of phenobarbital so far  He denies anxiety, nausea, vomiting, diaphoresis, tremors, restlessness, or AVH  Patient is seen resting in bed  Vital signs are significant for tachycardia at rate 112 and hypertension at 167/99  He is alert and oriented to self only, believes that we are in [place], [place] and is unable to state that he is in hospital   He is oriented to year but not month, stating it is November  Fidgeting and BUE tremor appreciated on exam  No tongue fasciculations present  /99 (BP Location: Left arm)   Pulse 90   Temp 98 1 °F (36 7 °C) (Temporal)   Resp 16   Ht 5' 4" (1 626 m)   Wt 64 1 kg (141 lb 5 oz)   SpO2 99%   BMI 24 26 kg/m²       SEWS Total Score: 11 (3/9/2022  1:27 AM)      Discussed patient status with attending? no    Plan:   · Will continue SEWS protocol and administer phenobarbital 130 mg now for SEWS score of 11  Patient will have received 1950 mg of phenobarbital total after this dose  · Continue symptomatic and supportive care for alcohol withdrawal   Patient politely declines p r n  medication at this time  · Continue monitoring vital signs, symptoms, sedation status

## 2022-03-09 NOTE — PROGRESS NOTES
[facility]  Progress Note - Richie Sorensen 1990, 29 y o  male MRN: [id number]  Unit/Bed#: 5T DETOX 504-01 Encounter: [id number]  Primary Care Provider: No primary care provider on file     Date and time admitted to hospital: 3/8/2022  4:25 PM    Progress Note - Medical Toxicology    Richie Sorensen 29 y o  male MRN: [id number]  Unit/Bed#: 5T DETOX 504-01 Encounter: [id number]  [address] [place] [address] 4  Department of Medical Toxicology  Reason for Admission/Principal Problem: alcohol withdrawal, alcohol use disorder   Rounding Provider: Corey Richey PA-C, Gilmer Arias, DO     * Alcohol withdrawal syndrome, with delirium [facility])  Assessment & Plan  · Patient transferred from [address] [place] [address] ED for alcohol withdrawal on 3/8/2022  · received 8 mg of Ativan IV and 650 mg of phenobarbital PTA  · Patient with history of daily alcohol consumption (1 gal of vodka daily)  · Last drank 2:00 p m on 03/07/2022  · Attempted alcohol cessation resulting in withdrawal seizure  · Suspect complication of delirium tremens  · Continue [facility] protocol for medically-assisted alcohol withdrawal  · Patient has thus far received 2080 mg phenobarbital  · Current symptoms include diaphoresis, tremor, disorientation   · precedex drip was initially initiated this morning, however BP decreased to 105/61 while patient remained diaphoretic, tremulous, and disoriented   · Will discontinue precedex and transition to ketamine infusion- start at 0 1 mg/kg/hr, max of 0 5 mg/kg/hr, titrate to goal RASS -2  · Update status to step-down 2  · Continue telemetry monitoring     Alcohol withdrawal seizure ([facility] [address] )  Assessment & Plan  · Patient with known history of daily alcohol consumption (1 gallon vodka daily)  · Witnessed generalized tonic-clonic seizure per reports upon alcohol cessation  · No known previous history of seizures or alcohol withdrawal seizure  · Imaging studies obtained prior to transfer: CT scan of the head was negative for any bleed with no aspiration on chest x-ray  · CT cervical spine and CT facial bones obtained upon admission 3/8/2022- both negative for acute abnormalities  · Exam positive for sequela from his seizure activity with noticeable dry oral and nasal blood  · Continue seizure precautions   · Alcohol withdrawal management as below    Alcohol use disorder, severe, dependence (HCC)  Assessment & Plan  · Admits daily alcohol use for several years  · Associated new onset withdrawal seizure from alcohol cessation  · Admits daily consumption of 1 gal of vodka per day; last drank vodka at 2:00 p m 03/07/2022  · No known history of detox/rehab treatment in the past  · continue high dose thiamine [id number] and Folic acid 1mg daily IV  · consult case management for disposition planning    Pancytopenia ([facility] [address] )  Assessment & Plan  · CBC this AM revealed hgb 10 4, MCV 93, WBCs 4 00, platelets 55  · Likely chronic 2/2 myelosuppression from chronic alcohol consumption  · Acute decrease likely dilution as decrease seen in all cell lines  · No acute signs of active bleeding  · Hemodynamically stable   · Continue to monitor and repeat CBC in AM  · Transfuse as indicated  · Will discontinue lovenox if platelets decrease <16     Alcoholic fatty liver  Assessment & Plan  · CT abd/pelvis 3/8/2022 revealed enlarged fatty liver, no ascites   · CMP this AM revealed elevated but down-trending LFTS  ·  -> 391  ·  -> 218  · Alk phos 153 -> 105  · Total bilirubin 2 18 -> 1 44   · Above findings 2/2 chronic alcohol consumption   · Continue to monitor LFTs, repeat CMP in AM  · Encourage alcohol cessation    Hyponatremia  Assessment & Plan  · CMP this AM revealed mild hyponatremia- sodium 133, improved from 131 yesterday  · Continue IVFs  · Continue to monitor    Hypokalemia  Assessment & Plan  · CMP this AM revealed mild hypokalemia with K+ 3 3  · Administer supplementation IV  · Continue to monitor electrolytes and replete as necessary     Major depressive disorder  Assessment & Plan  · History of depression, currently on Effexor  · Continue to hold effexor for now    Abnormal serum level of lipase  Assessment & Plan  · Initial lipase 440 in ED, increased to 888 on admission  · Repeat lipase this  (down-trending)  · Likely a sequela from alcohol consumption  · CT abd/pelvis: fatty liver, pancreas unremarkable, no calcified gallstones, no pericholecystic   · No grimace on palpation of the abdomen  · Routine monitoring     Alcoholic ketoacidosis-resolved as of 3/9/2022  Assessment & Plan  · Initial CMP 3/8/2022 revealed anion gap of 30, CO2 of 15; lactate of 16 6  · Elevated AG metabolic acidosis likely multifactorial from AKA and lactic acidosis from seizure  · Repeat CMP this AM revealed AG 10, chloride 101  · Repeat Lactic acid 0 7 this AM  · Metabolic acidosis resolved    VTE Pharmacologic Prophylaxis:   Pharmacologic: Enoxaparin (Lovenox)  Mechanical VTE Prophylaxis in Place: no    Code Status: Level 1 - Full Code    Patient Centered Rounds: I have performed bedside rounds with nursing staff today  Discussions with Specialists or Other Care Team Provider: Dr Tristin Valerio   Education and Discussions with Family / Patient: discussed current plan with patient, answered all questions to best of my ability     Time Spent for Care: 45 minutes  More than 50% of total time spent on counseling and coordination of care as described above  Current Length of Stay: 1 day(s)    Current Patient Status: Inpatient     Certification Statement: The patient will continue to require additional inpatient hospital stay due to ongoing management of medically-assisted alcohol withdrawal    Discharge Plan: final disposition pending medical stabilization, possibly to inpatient alcohol rehab       Total Critical Care time spent 45 minutes excluding procedures, teaching and family updates  Subjective:   Patient seen and examined bedside this morning  Patient interaction limited 2/2 severe alcohol withdrawal  Patient denies acute complaints and states he feels "fine "    Objective:     Clinical Opiate Withdrawal Scale  Pulse: 101    SEWS Total Score: 10 (3/9/2022 10:00 AM)        Last 24 Hours Medication List:   Current Facility-Administered Medications   Medication Dose Route Frequency Provider Last Rate    acetaminophen  650 mg Oral Q6H PRN Obioma Deisy Atcampos, PA-C      enoxaparin  40 mg Subcutaneous Daily Obioma Deisy Parsons, PA-C      folic acid IVPB  1 mg Intravenous Daily Obioma Deisy Parsons, PA-C 1 mg (22 0845)    ketamine  0 1 mg/kg/hr Intravenous Continuous Coni Defrancisco, PA-C 0 1 mg/kg/hr (22 1018)    LORazepam  2 mg Intravenous Once PRN Coni Defrancisco, PA-C      multivitamin-minerals  1 tablet Oral Daily Obioma Deisy Parsons, PA-C      ondansetron  4 mg Intravenous Q6H PRN Obioma Deisy [place], PA-C      PHENobarbital  130 mg Intravenous Once Arno Saline, DO      potassium chloride  20 mEq Intravenous Once Coni Defrancisco, PA-C      sodium chloride  125 mL/hr Intravenous Continuous Obioma Deisy Parsons, PA-C 125 mL/hr (22 1053)    thiamine  500 mg Intravenous Q8H Obioma Deisy [place], PA-C 500 mg (22 1053)    traZODone  50 mg Oral HS PRN Obioma Deisy Parsons, PA-C           Vitals:   Temp (24hrs), Av 8 °F (37 1 °C), Min:97 7 °F (36 5 °C), Max:100 °F (37 8 °C)    Temp:  [97 7 °F (36 5 °C)-100 °F (37 8 °C)] 98 4 °F (36 9 °C)  HR:  [] 101  Resp:  [2-39] 18  BP: (105-175)/(61-99) 138/76  SpO2:  [94 %-100 %] 100 %  Body mass index is 24 26 kg/m²  Input and Output Summary (last 24 hours):     Intake/Output Summary (Last 24 hours) at 3/9/2022 1154  Last data filed at 3/9/2022 1101  Gross per 24 hour   Intake 3239 98 ml   Output 1400 ml   Net 1839 98 ml       Physical Exam:   Physical Exam  Vitals and nursing note reviewed  Constitutional:       Appearance: He is well-developed and normal weight  He is ill-appearing and diaphoretic  Comments: RUE in soft restraint   HENT:      Head: Normocephalic and atraumatic  Nose: Nose normal  No congestion  Right Nostril: No septal hematoma  Left Nostril: No septal hematoma  Comments: Dried blood noted in nares b/l     Mouth/Throat:      Mouth: Mucous membranes are dry  Tongue: Lesions (bite wounds noted on left tip of tongue and right lateral tongue ) present  Comments: Dried blood appreciated on lips  Eyes:      General: No scleral icterus  Extraocular Movements: Extraocular movements intact  Right eye: No nystagmus  Left eye: No nystagmus  Conjunctiva/sclera: Conjunctivae normal       Pupils: Pupils are equal, round, and reactive to light  Cardiovascular:      Rate and Rhythm: Normal rate and regular rhythm  Pulses: Normal pulses  Heart sounds: Normal heart sounds  No murmur heard  No friction rub  No gallop  Pulmonary:      Effort: Pulmonary effort is normal  No respiratory distress  Breath sounds: Normal breath sounds  No wheezing, rhonchi or rales  Abdominal:      General: Abdomen is flat  Bowel sounds are normal  There is no distension  Palpations: Abdomen is soft  Tenderness: There is no abdominal tenderness  There is no guarding  Musculoskeletal:         General: Normal range of motion  Cervical back: Normal range of motion and neck supple  Right lower leg: No edema  Left lower leg: No edema  Skin:     General: Skin is warm  Capillary Refill: Capillary refill takes less than 2 seconds  Neurological:      General: No focal deficit present  Mental Status: He is alert  He is disoriented  Motor: Tremor present        Comments: Oriented to person only    Psychiatric:      Comments: Limited verbal interaction        Additional Data:     Labs: keep all most recent labs as listed on admission templates   Results from last 7 days   Lab Units 03/09/22  0727 03/08/22  1708 03/08/22  1708 03/08/22  1214 03/08/22  1214   WBC Thousand/uL 4 00*   < > 7 10   < > 9 77   HEMOGLOBIN g/dL 10 4*   < > 12 0*   < > 13 8   HEMATOCRIT % 30 5*   < > 34 6*   < > 41 4   PLATELETS Thousands/uL 55*   < > 74*   < > 131*   NEUTROS PCT %  --   --   --   --  74   LYMPHS PCT %  --   --   --   --  13*   LYMPHO PCT %  --   --  4*  --   --    MONOS PCT %  --   --   --   --  10   MONO PCT %  --   --  15*  --   --    EOS PCT %  --   --   --   --  0    < > = values in this interval not displayed  Results from last 7 days   Lab Units 03/09/22  0823   SODIUM mmol/L 133*   POTASSIUM mmol/L 3 3*   CHLORIDE mmol/L 101   CO2 mmol/L 22   BUN mg/dL 7   CREATININE mg/dL 0 40*   ANION GAP mmol/L 10   CALCIUM mg/dL 7 1*   ALBUMIN g/dL 3 9   TOTAL BILIRUBIN mg/dL 1 44*   ALK PHOS U/L 105   ALT U/L 218*   AST U/L 391*   GLUCOSE RANDOM mg/dL 68*      Results from last 7 days   Lab Units 03/08/22  1213   INR  0 94                Results from last 7 days   Lab Units 03/09/22  0727 03/08/22  1708 03/08/22  1213   LACTIC ACID mmol/L 0 7 1 4 16 6*        * I Have Reviewed All Lab Data Listed Above  * Additional Pertinent Lab Tests Reviewed: All Labs Within Last 24 Hours Reviewed      Imaging Studies: I have personally reviewed pertinent reports  Recent Cultures (last 7 days): Today, Patient Was Seen By: Hadley Salvador PA-C    ** Please Note: Dictation voice to text software may have been used in the creation of this document   **

## 2022-03-09 NOTE — ASSESSMENT & PLAN NOTE
· CMP this AM revealed mild hyponatremia- sodium 133, improved from 131 yesterday  · Continue IVFs  · Continue to monitor

## 2022-03-09 NOTE — ASSESSMENT & PLAN NOTE
· CBC this AM revealed hgb 10 4, MCV 93, WBCs 4 00, platelets 55  · Likely chronic 2/2 myelosuppression from chronic alcohol consumption  · Acute decrease likely dilution as decrease seen in all cell lines  · No acute signs of active bleeding  · Hemodynamically stable   · Continue to monitor and repeat CBC in AM  · Transfuse as indicated  · Will discontinue lovenox if platelets decrease <33

## 2022-03-09 NOTE — UTILIZATION REVIEW
Initial Clinical Review    Pt initially presented to [address] [place] [address] ED  Pt was transferred by EMS to [address] [place] [address] for its Level IV medically managed intensive inpatient detox unit, not available at [facility]  Admission: Date/Time/Statement:   Admission Orders (From admission, onward)     Ordered        03/08/22 1704  Inpatient Admission  Once                      Orders Placed This Encounter   Procedures    Inpatient Admission     Standing Status:   Standing     Number of Occurrences:   1     Order Specific Question:   Level of Care     Answer:   Med Surg [16]     Order Specific Question:   Estimated length of stay     Answer:   More than 2 Midnights     Order Specific Question:   Certification     Answer:   I certify that inpatient services are medically necessary for this patient for a duration of greater than two midnights  See H&P and MD Progress Notes for additional information about the patient's course of treatment  Initial Presentation: 29 y o  male who initially presented to [address] [place] [address], was then transferred by EMS to [address] [place] [address] as a direct admission to medical detox  Inpatient admission for evaluation and treatment of alcohol withdrawal syndrome, alcoholic ketoacidosis, and alcohol use disorder  PMHx: Depression, HTN  Presented w/ alcohol withdrawal symptoms s/t seizure activity  Reports drinking 1 gallon of vodka daily, last drink 03/07/22 @ 1400  Reports no hx of detox or rehab for alcohol use  On exam, slightly agitated w/ tremors, somnolent, anxiety  SEWS 16  Plan: SEWS monitoring w/ phenobarbital management, thiamine/folic acid supplement, IVF, telemetry, hold home meds, trend labs, replete electrolytes as needed  Date: 03/09/22   Day 2: Pt interaction limited s/t severe alcohol withdrawal  Precedex gtt was started briefly this morning but BP dropped without improvement in symptoms  SEWS 10   On exam, ill-appearing, diaphoretic, dry mucous membranes, bite wounds on tongue, disoriented (only oriented to person), tremor, limited verbal interaction  Plan: SEWS monitoring w/ phenobarbital management, transition to ketamine infusion titrated to a goal RASS -2, high dose thiamine/folic acid supplement, IVF, telemetry, hold home meds, trend labs, replete electrolytes as needed       ED Triage Vitals   Temperature Pulse Respirations Blood Pressure SpO2   03/08/22 1637 03/08/22 1637 03/08/22 1637 03/08/22 1637 03/08/22 1637   99 5 °F (37 5 °C) (!) 123 20 164/95 99 %      Temp Source Heart Rate Source Patient Position - Orthostatic VS BP Location FiO2 (%)   03/08/22 1637 03/08/22 1637 03/08/22 1637 03/08/22 1637 --   Temporal Monitor Lying Left arm       Pain Score       03/08/22 2200       No Pain          Wt Readings from Last 1 Encounters:   03/08/22 64 1 kg (141 lb 5 oz)     Additional Vital Signs:   Date/Time Temp Pulse Resp BP SpO2 O2 Device   03/09/22 0945 -- 90 20 105/61 100 % --   03/09/22 0930 -- 88 20 114/70 100 % --   03/09/22 0856 98 4 °F (36 9 °C) 86 18 133/80 100 % --   03/09/22 0741 97 7 °F (36 5 °C) 81 18 137/99 99 % None (Room air)   03/09/22 0346 98 3 °F (36 8 °C) 100 18 143/91 99 % None (Room air)   03/09/22 0300 98 1 °F (36 7 °C) 98 18 142/89 98 % None (Room air)   03/09/22 0200 -- 90 -- -- 99 % None (Room air)   03/09/22 0129 98 1 °F (36 7 °C) 112 Abnormal  16 167/99 100 % None (Room air)   03/08/22 2322 98 8 °F (37 1 °C) 101 16 109/64 97 % None (Room air)   03/08/22 2246 98 9 °F (37 2 °C) 107 Abnormal  18 122/88 100 % None (Room air)   03/08/22 2200 100 °F (37 8 °C) 110 Abnormal  18 138/86 98 % None (Room air)   03/08/22 2042 -- 100 18 127/78 98 % None (Room air)   03/08/22 2000 -- 100 18 127/78 98 % None (Room air)   03/08/22 1939 99 9 °F (37 7 °C) 108 Abnormal  18 133/89 98 % None (Room air)   03/08/22 1830 -- 108 Abnormal  18 133/89 97 % --   03/08/22 1749 100 °F (37 8 °C) 112 Abnormal  20 135/72 95 % --       Severity of Ethanol Withdrawal Scale (SEWS):   03/09/22 1000 03/09/22 0759 03/09/22 0530 03/09/22 0430 03/09/22 0347   ANXIETY: Do you feel that something bad is about to happen to you right now? 0  -JS 0  -JS 0  -MD 0  -MD 3  -MD   NAUSEA and DRY HEAVES or VOMITING? 0  -JS 0  -JS 0  -MD 0  -MD 0  -MD   SWEATING: (includes moist palms, sweating now)? Score 0 or 2 2  -JS 2  -JS 0  -MD 0  -MD 0  -MD   TREMOR: with arms extended eyes closed? 2  -JS 2  -JS 0  -MD 0  -MD 2  -MD   AGITATION: Fidgety, restless, pacing? 0  -JS 0  -JS 0  -MD 0  -MD 3  -MD   DISORIENTATION: 3  -JS 3  -JS 0  -MD 0  -MD 1  -MD   HALLUCINATIONS: 3  -JS 3  -JS 0  -MD 0  -MD 0  -MD   VITAL SIGNS: ANY (Pulse >592, Diastolic BP >22, Temp >25 8) 0  -JS 3  -JS 0  -MD 0  -MD 3  -MD   SEWS Total Score 10  -JS 13  -JS 0  -MD 0  -MD 12  -MD   Mays Agitation Sedation Scale (RASS) 0  -JS +1  -JS -1  -MD 0  -MD 0  -MD    03/09/22 0300 03/09/22 0200 03/09/22 0127 03/09/22 0000 03/08/22 2247   ANXIETY: Do you feel that something bad is about to happen to you right now? 0  -MD 0  -MD 0  -MD 0  -MD 0  -MD   NAUSEA and DRY HEAVES or VOMITING? 0  -MD 0  -MD 0  -MD 0  -MD 0  -MD   SWEATING: (includes moist palms, sweating now)? Score 0 or 2 0  -MD 0  -MD 0  -MD 0  -MD 2  -MD   TREMOR: with arms extended eyes closed? 0  -MD 0  -MD 2  -MD 0  -MD 2  -MD   AGITATION: Fidgety, restless, pacing? 0  -MD 0  -MD 3  -MD 0  -MD 3  -MD   DISORIENTATION: 0  -MD 0  -MD 3  -MD 0  -MD 3  -MD   HALLUCINATIONS: 0  -MD 0  -MD 0  -MD 0  -MD 3  -MD   VITAL SIGNS: ANY (Pulse >749, Diastolic BP >61, Temp >76 9) 0  -MD 0  -MD 3  -MD 0  -MD 3  -MD   SEWS Total Score 0  -MD 0  -MD 11  -MD 0  -MD 16  -MD   Mays Agitation Sedation Scale (RASS) 0  -MD -1  -MD 0  -MD -2  -MD -1  -MD   Row Name 03/08/22 2200 03/08/22 2043 03/08/22 2000 03/08/22 1940 03/08/22 1830   ANXIETY: Do you feel that something bad is about to happen to you right now?   0  -MD 0  -MD 0  -MD 3  -MD 0  -JS   NAUSEA and DRY HEAVES or VOMITING? 0  -MD 0  -MD 0  -MD 0  -MD 0  -JS   SWEATING: (includes moist palms, sweating now)? Score 0 or 2 0  -MD 0  -MD 0  -MD 0  -MD 0  -JS   TREMOR: with arms extended eyes closed? 2  -MD 0  -MD -- 2  -MD 0  -JS   AGITATION: Fidgety, restless, pacing? 3  -MD 0  -MD -- 3  -MD 0  -JS   DISORIENTATION: 3  -MD 0  -MD -- 0  -MD 0  -JS   HALLUCINATIONS: 1  -MD 0  -MD -- 0  -MD 0  -JS   VITAL SIGNS: ANY (Pulse >551, Diastolic BP >78, Temp >96 0) 3  -MD 0  -MD -- 3  -MD 0  -JS   SEWS Total Score 12  -MD 0  -MD -- 11  -MD 0  -JS   Mays Agitation Sedation Scale (RASS) -1  -MD -2  -MD -- -1  -MD -1 -JS   Row Name 03/08/22 1750 03/08/22 1700 03/08/22 1641     ANXIETY: Do you feel that something bad is about to happen to you right now? 0  -JS -- 0  -JS     NAUSEA and DRY HEAVES or VOMITING? 0  -JS -- 0  -JS     SWEATING: (includes moist palms, sweating now)? Score 0 or 2 0  -JS -- 2  -JS     TREMOR: with arms extended eyes closed? 2  -JS -- 2  -JS     AGITATION: Fidgety, restless, pacing? 0  -JS -- 3  -JS     DISORIENTATION: 3  -JS -- 3  -JS     HALLUCINATIONS: 0  -JS -- 3  -JS     VITAL SIGNS: ANY (Pulse >350, Diastolic BP >46, Temp >48 4) 3  -JS -- 3  -JS     SEWS Total Score 8  -JS -- 16  -JS     Mays Agitation Sedation Scale (RASS) 0  -JS +2  -JS +2  -JS         Pertinent Labs/Diagnostic Test Results:   3/8 - EKG: Sinus Tachycardia    CT facial bones wo contrast   Final Result by Tanesha Tse MD (03/08 2011)      Normal noncontrast CT of the facial bones  Workstation performed: [id number]         CT spine cervical wo contrast   Final Result by Tanesha Tse MD (03/08 2013)      No cervical spine fracture or traumatic malalignment  Workstation performed: [id number]         CT abdomen pelvis w contrast   Final Result by Tanesha Tse MD (03/08 2009)      No acute inflammatory process identified in the abdomen or pelvis  Enlarged fatty liver              Workstation performed: [id number]           Results from last 7 days   Lab Units 03/08/22  1212   SARS-COV-2  Negative     Results from last 7 days   Lab Units 03/09/22  0727 03/08/22  1708 03/08/22  1214   WBC Thousand/uL 4 00* 7 10 9 77   HEMOGLOBIN g/dL 10 4* 12 0* 13 8   HEMATOCRIT % 30 5* 34 6* 41 4   PLATELETS Thousands/uL 55* 74* 131*   NEUTROS ABS Thousands/µL  --   --  7 30   TOTAL NEUT ABS Thousand/uL  --  5 75  --      Results from last 7 days   Lab Units 03/09/22  0823 03/08/22  1708 03/08/22  1213   SODIUM mmol/L 133* 131* 138   POTASSIUM mmol/L 3 3* 4 5 5 2   CHLORIDE mmol/L 101 97 93*   CO2 mmol/L 22 26 15*   ANION GAP mmol/L 10 8 30*   BUN mg/dL 7 13 14   CREATININE mg/dL 0 40* 0 50* 0 84   EGFR ml/min/1 73sq m 159 145 117   CALCIUM mg/dL 7 1* 7 7* 9 1   MAGNESIUM mg/dL  --  2 1 2 0     Results from last 7 days   Lab Units 03/09/22  0823 03/08/22  1708 03/08/22  1213   AST U/L 391* 498* 566*   ALT U/L 218* 263* 358*   ALK PHOS U/L 105 118 153*   TOTAL PROTEIN g/dL 7 0 7 2 7 8   ALBUMIN g/dL 3 9 4 2 4 1   TOTAL BILIRUBIN mg/dL 1 44* 2 16* 2 18*   BILIRUBIN DIRECT mg/dL  --   --  0 88*     Results from last 7 days   Lab Units 03/09/22  0823 03/08/22  1708 03/08/22  1213   GLUCOSE RANDOM mg/dL 68* 114* 216*     BETA-HYDROXYBUTYRATE   Date Value Ref Range Status   03/08/2022 1 9 (H) <0 6 mmol/L Final      Results from last 7 days   Lab Units 03/08/22  1708   PH JO-ANN  7 421*   PCO2 JO-ANN mm Hg 39 3*   PO2 JO-ANN mm Hg 49 9*   HCO3 JO-ANN mmol/L 25 0   BASE EXC JO-ANN mmol/L 0 6   O2 CONTENT JO-ANN ml/dL 14 8   O2 HGB, VENOUS % 82 1*     Results from last 7 days   Lab Units 03/08/22  1708 03/08/22  1213   CK TOTAL U/L 434* 534*   CK MB INDEX % 1 2 1 4   CK MB ng/mL 5 0* 7 3*     Results from last 7 days   Lab Units 03/08/22  1415 03/08/22  1213   HS TNI 0HR ng/L  --  16   HS TNI 2HR ng/L 18  --    HSTNI D2 ng/L 2  --      Results from last 7 days   Lab Units 03/08/22  1213   PROTIME seconds 12 1   INR  0 94   PTT seconds 25 Results from last 7 days   Lab Units 03/08/22  1213   TSH 3RD GENERATON uIU/mL 4 020*     Results from last 7 days   Lab Units 03/09/22  0727 03/08/22  1708 03/08/22  1213   LACTIC ACID mmol/L 0 7 1 4 16 6*     Results from last 7 days   Lab Units 03/09/22  0823 03/08/22  1708 03/08/22  1213   LIPASE u/L 434* 888* 440*     Results from last 7 days   Lab Units 03/08/22  1212   INFLUENZA A PCR  Negative   INFLUENZA B PCR  Negative   RSV PCR  Negative     Results from last 7 days   Lab Units 03/08/22  1708   AMPH/METH  Negative   BARBITURATE UR  Positive*   BENZODIAZEPINE UR  Negative   COCAINE UR  Negative   METHADONE URINE  Negative   OPIATE UR  Negative   PCP UR  Negative   THC UR  Negative     Results from last 7 days   Lab Units 03/08/22  1213   ETHANOL LVL mg/dL <3   ACETAMINOPHEN LVL ug/mL <5 4*   SALICYLATE LVL mg/dL <3*     Results from last 7 days   Lab Units 03/08/22  1708   TOTAL COUNTED  100           Past Medical History:   Diagnosis Date    Alcoholism ([place] [address] )     Hypertension     Psychiatric disorder      Present on Admission:   Alcoholic ketoacidosis   Alcohol withdrawal syndrome with complication (HCC)   Alcohol use disorder, severe, dependence ([facility] )   Alcohol withdrawal seizure ([place] [address] )   Transaminitis   Abnormal serum level of lipase   Major depressive disorder      Admitting Diagnosis: Alcohol withdrawal ([place] [address] ) [F10 [id number]]  Age/Sex: 29 y o  male  Admission Orders:  Regular Diet  Seizure Precautions,  Telemetry  Continuous Pulse Ox  SEWS monitoring  SCDs  B/l UE soft limb restraints      Scheduled Medications:  enoxaparin, 40 mg, Subcutaneous, Daily  folic acid IVPB, 1 mg, Intravenous, Daily  multivitamin-minerals, 1 tablet, Oral, Daily  thiamine, 500 mg, Intravenous, Q8H    Continuous IV Infusions:  dexmedetomidine, 0 1-0 7 mcg/kg/hr, Intravenous, Titratable; Start: 03/09/22 0830 End: 03/09/22 0954  ketamine, 0 1 mg/kg/hr, Intravenous, Continuous  sodium chloride, 125 mL/hr, Intravenous, Continuous    PRN Meds:  acetaminophen, 650 mg, Oral, Q6H PRN  ondansetron, 4 mg, Intravenous, Q6H PRN  phenobarbital, 650 mg, Intravenous; 3/8 x1  phenobarbital, 130 mg, Intravenous; 3/8 x4, 3/9 x2  potassium chloride, 20 mEq, Intravenous; 3/9 x1  traZODone, 50 mg, Oral, HS PRN        IP CONSULT TO CASE MANAGEMENT    Network Utilization Review Department  ATTENTION: Please call with any questions or concerns to [phone] and carefully listen to the prompts so that you are directed to the right person  All voicemails are confidential   Thierry Lainez all requests for admission clinical reviews, approved or denied determinations and any other requests to dedicated fax number below belonging to the campus where the patient is receiving treatment   List of dedicated fax numbers for the Facilities:  [address] DENIALS (Administrative/Medical Necessity) [phone]   [address] (Maternity/NICU/Pediatrics) [phone]   [address]  [address] [address] [place] [address] [address] [facility] [address] [facility] [phone]

## 2022-03-10 PROBLEM — E87.5 HYPERKALEMIA: Status: ACTIVE | Noted: 2022-03-09

## 2022-03-10 LAB
ALBUMIN SERPL BCP-MCNC: 4.1 G/DL (ref 3–5.2)
ALP SERPL-CCNC: 127 U/L (ref 43–122)
ALT SERPL W P-5'-P-CCNC: 187 U/L
ANION GAP SERPL CALCULATED.3IONS-SCNC: 12 MMOL/L (ref 5–14)
AST SERPL W P-5'-P-CCNC: 304 U/L (ref 17–59)
BILIRUB SERPL-MCNC: 1.69 MG/DL
BUN SERPL-MCNC: 6 MG/DL (ref 5–25)
CALCIUM SERPL-MCNC: 7 MG/DL (ref 8.4–10.2)
CHLORIDE SERPL-SCNC: 99 MMOL/L (ref 97–108)
CO2 SERPL-SCNC: 18 MMOL/L (ref 22–30)
CREAT SERPL-MCNC: 0.39 MG/DL (ref 0.7–1.5)
ERYTHROCYTE [DISTWIDTH] IN BLOOD BY AUTOMATED COUNT: 15.8 %
GFR SERPL CREATININE-BSD FRML MDRD: 161 ML/MIN/1.73SQ M
GLUCOSE SERPL-MCNC: 75 MG/DL (ref 70–99)
HCT VFR BLD AUTO: 38.5 % (ref 41–53)
HGB BLD-MCNC: 13.3 G/DL (ref 13.5–17.5)
LIPASE SERPL-CCNC: 313 U/L (ref 23–300)
MAGNESIUM SERPL-MCNC: 1.6 MG/DL (ref 1.6–2.3)
MCH RBC QN AUTO: 32.3 PG (ref 26–34)
MCHC RBC AUTO-ENTMCNC: 34.6 G/DL (ref 31–36)
MCV RBC AUTO: 93 FL (ref 80–100)
PLATELET # BLD AUTO: 72 THOUSANDS/UL (ref 150–450)
PMV BLD AUTO: 8.3 FL (ref 8.9–12.7)
POTASSIUM SERPL-SCNC: 5.1 MMOL/L (ref 3.6–5)
PROT SERPL-MCNC: 7.7 G/DL (ref 5.9–8.4)
RBC # BLD AUTO: 4.12 MILLION/UL (ref 4.5–5.9)
SODIUM SERPL-SCNC: 129 MMOL/L (ref 137–147)
WBC # BLD AUTO: 4.8 THOUSAND/UL (ref 4.5–11)

## 2022-03-10 PROCEDURE — 85027 COMPLETE CBC AUTOMATED: CPT | Performed by: PHYSICIAN ASSISTANT

## 2022-03-10 PROCEDURE — 99232 SBSQ HOSP IP/OBS MODERATE 35: CPT | Performed by: PHYSICIAN ASSISTANT

## 2022-03-10 PROCEDURE — 83735 ASSAY OF MAGNESIUM: CPT | Performed by: PHYSICIAN ASSISTANT

## 2022-03-10 PROCEDURE — 80053 COMPREHEN METABOLIC PANEL: CPT | Performed by: PHYSICIAN ASSISTANT

## 2022-03-10 PROCEDURE — NC001 PR NO CHARGE: Performed by: PHYSICIAN ASSISTANT

## 2022-03-10 PROCEDURE — 83690 ASSAY OF LIPASE: CPT | Performed by: PHYSICIAN ASSISTANT

## 2022-03-10 RX ORDER — PHENOBARBITAL SODIUM 130 MG/ML
260 INJECTION INTRAMUSCULAR ONCE
Status: COMPLETED | OUTPATIENT
Start: 2022-03-10 | End: 2022-03-10

## 2022-03-10 RX ORDER — PHENOBARBITAL 64.8 MG/1
64.8 TABLET ORAL ONCE
Status: COMPLETED | OUTPATIENT
Start: 2022-03-10 | End: 2022-03-10

## 2022-03-10 RX ORDER — PHENOBARBITAL SODIUM 130 MG/ML
130 INJECTION INTRAMUSCULAR ONCE
Status: DISCONTINUED | OUTPATIENT
Start: 2022-03-10 | End: 2022-03-10

## 2022-03-10 RX ORDER — LIDOCAINE HYDROCHLORIDE 20 MG/ML
15 SOLUTION OROPHARYNGEAL 4 TIMES DAILY PRN
Status: DISCONTINUED | OUTPATIENT
Start: 2022-03-10 | End: 2022-03-10

## 2022-03-10 RX ADMIN — PHENOBARBITAL SODIUM 260 MG: 130 INJECTION INTRAMUSCULAR at 15:59

## 2022-03-10 RX ADMIN — ENOXAPARIN SODIUM 40 MG: 100 INJECTION SUBCUTANEOUS at 08:13

## 2022-03-10 RX ADMIN — SODIUM CHLORIDE 125 ML/HR: 9 INJECTION, SOLUTION INTRAVENOUS at 03:12

## 2022-03-10 RX ADMIN — SODIUM CHLORIDE 125 ML/HR: 9 INJECTION, SOLUTION INTRAVENOUS at 18:55

## 2022-03-10 RX ADMIN — THIAMINE HYDROCHLORIDE 500 MG: 100 INJECTION, SOLUTION INTRAMUSCULAR; INTRAVENOUS at 11:28

## 2022-03-10 RX ADMIN — PHENOBARBITAL 64.8 MG: 64.8 TABLET ORAL at 13:12

## 2022-03-10 RX ADMIN — PHENOBARBITAL 64.8 MG: 64.8 TABLET ORAL at 17:01

## 2022-03-10 RX ADMIN — THIAMINE HYDROCHLORIDE 500 MG: 100 INJECTION, SOLUTION INTRAMUSCULAR; INTRAVENOUS at 03:20

## 2022-03-10 RX ADMIN — FOLIC ACID 1 MG: 5 INJECTION, SOLUTION INTRAMUSCULAR; INTRAVENOUS; SUBCUTANEOUS at 08:13

## 2022-03-10 RX ADMIN — SODIUM CHLORIDE 125 ML/HR: 9 INJECTION, SOLUTION INTRAVENOUS at 12:57

## 2022-03-10 RX ADMIN — MULTIPLE VITAMINS W/ MINERALS TAB 1 TABLET: TAB ORAL at 08:13

## 2022-03-10 RX ADMIN — THIAMINE HYDROCHLORIDE 500 MG: 100 INJECTION, SOLUTION INTRAMUSCULAR; INTRAVENOUS at 18:30

## 2022-03-10 RX ADMIN — PHENOBARBITAL SODIUM 260 MG: 130 INJECTION INTRAMUSCULAR at 12:14

## 2022-03-10 RX ADMIN — LIDOCAINE HYDROCHLORIDE 10 ML: 20 SOLUTION OROPHARYNGEAL at 18:37

## 2022-03-10 NOTE — ASSESSMENT & PLAN NOTE
· Admits daily alcohol use for several years  · Associated new onset withdrawal seizure from alcohol cessation  · Admits daily consumption of 1 gal of vodka per day; last drank vodka at 2:00 p m 03/07/2022  · No known history of detox/rehab treatment in the past  · continue high dose thiamine 204LM K0AQH and Folic acid 1mg daily IV  · consult case management for disposition planning

## 2022-03-10 NOTE — PROGRESS NOTES
[facility]  Progress Note - Shahid Forde 1990, 29 y o  male MRN: [id number]  Unit/Bed#: 5T DETOX 504-01 Encounter: [id number]  Primary Care Provider: No primary care provider on file     Date and time admitted to hospital: 3/8/2022  4:25 PM    Progress Note - Medical Toxicology    Shahid Forde 29 y o  male MRN: [id number]  Unit/Bed#: 5T DETOX 504-01 Encounter: [id number]  [address] [place] [address], LEVEL 4  Department of Medical Toxicology  Reason for Admission/Principal Problem: alcohol withdrawal  Rounding Provider: Stefan Keane PA-C, Scotty Waggoner, DO     * Delirium tremens [facility])  Assessment & Plan  · Patient transferred from [address] [place] [address] for alcohol withdrawal on 3/8/2022  · received 8 mg of Ativan IV and 650 mg of phenobarbital PTA  · Patient with history of daily alcohol consumption (1 gal of vodka daily)  · Last drank 2:00 p m on 03/07/2022  · Attempted alcohol cessation resulting in withdrawal seizure  · Suspect complication of delirium tremens  · Continue SEWS protocol for medically-assisted alcohol withdrawal  · Patient has thus far received 2080 mg phenobarbital  · Mental status improved this morning- currently A&Ox3, no hallucinations  · Exhibiting intentional tremor, diaphoretic, mildly tachycardic  · Continue ketamine infusion- started yesterday at 0 1 mg/kg/hr- maintained at 0 1 mg/kg/hr  · Will continue SEWS with phenobarbital until max of 3 g  · Consider future titration of ketamine PRN if max 3 g phenobarbital reached   · Continue telemetry monitoring     Alcohol withdrawal seizure ([facility] [address] )  Assessment & Plan  · Patient with known history of daily alcohol consumption (1 gallon vodka daily)  · Witnessed generalized tonic-clonic seizure per reports upon alcohol cessation  · No known previous history of seizures or alcohol withdrawal seizure  · Imaging studies obtained prior to transfer: CT scan of the head was negative for any bleed with no aspiration on chest x-ray  · CT cervical spine and CT facial bones obtained upon admission 3/8/2022- both negative for acute abnormalities  · Exam positive for sequela from his seizure activity with noticeable dry oral and nasal blood  · Continue seizure precautions   · Alcohol withdrawal management as below    Alcohol use disorder, severe, dependence (HCC)  Assessment & Plan  · Admits daily alcohol use for several years  · Associated new onset withdrawal seizure from alcohol cessation  · Admits daily consumption of 1 gal of vodka per day; last drank vodka at 2:00 p m 03/07/2022  · No known history of detox/rehab treatment in the past  · continue high dose thiamine [id number] and Folic acid 1mg daily IV  · consult case management for disposition planning    Pancytopenia ([facility] [address] )  Assessment & Plan  · CBC this AM revealed improvement of all cell lines: hgb 13 3, MCV 93, WBCs 4 80, platelets 72  · Likely chronic 2/2 myelosuppression from chronic alcohol consumption  · No acute signs of active bleeding  · Hemodynamically stable   · Continue to monitor and repeat CBC in AM  · Transfuse as indicated  · Will discontinue lovenox if platelets decrease <67     Alcoholic fatty liver  Assessment & Plan  · CT abd/pelvis 3/8/2022 revealed enlarged fatty liver, no ascites   · CMP this AM revealed elevated but down-trending LFTS  ·  -> 304  ·  -> 187  · Alk phos 153 -> 127  · Total bilirubin 2 18 -> 1 69  · Above findings 2/2 chronic alcohol consumption   · Continue to monitor LFTs, repeat CMP in AM  · Encourage alcohol cessation    Hyponatremia  Assessment & Plan  · CMP this AM revealed mild hyponatremia- sodium 129, down from 133 yesterday   · Continue IVFs  · Continue to monitor    Hyperkalemia  Assessment & Plan  · CMP this AM revealed mild hyperkalemia with K+ 5 1  · Was 3 3 yesterday, received supplementation  · Specimen was hemolyzed- likely contributing to mild elevation   · Continue to monitor electrolytes and replete as necessary     Major depressive disorder  Assessment & Plan  · History of depression, currently on Effexor  · Currently denies SI, thoughts of self-harm  · Continue to hold effexor for now    Abnormal serum level of lipase  Assessment & Plan  · Initial lipase 440 in ED, increased to 888 on admission  · Repeat lipase this  (down-trending, was 434 yesterday)  · Likely a sequela from alcohol consumption  · CT abd/pelvis: fatty liver, pancreas unremarkable, no calcified gallstones, no pericholecystic   · Currently denies abdominal pain  · No grimace on palpation of the abdomen, no TTP, no guarding  · Routine monitoring     VTE Pharmacologic Prophylaxis:   Pharmacologic: Enoxaparin (Lovenox)  Mechanical VTE Prophylaxis in Place: yes    Code Status: Level 1 - Full Code    Patient Centered Rounds: I have performed bedside rounds with nursing staff today  Discussions with Specialists or Other Care Team Provider: Dr Paddy Villagomez   Education and Discussions with Family / Patient: discussed current plan with patient, answered all questions to best of my ability     Time Spent for Care: 30 minutes  More than 50% of total time spent on counseling and coordination of care as described above  Current Length of Stay: 2 day(s)    Current Patient Status: Inpatient     Certification Statement: The patient will continue to require additional inpatient hospital stay due to ongoing management of medically-assisted alcohol withdrawal   Discharge Plan: TBD pending medical stability      Total Critical Care time spent 40 minutes excluding procedures, teaching and family updates  Subjective:   Patient seen and examined this morning  Patient reports mild intermittent mouth discomfort 2/2 tongue and dentition injuries from seizure PTA  Patient report he is able to tolerate PO intake, denies dysphagia/choking  Also reports mild double vision since he awoke this morning   Notes occasional loose stools, non-bloody  Currently denies headaches, lightheadedness/dizziness  Coughing/sneezing/congestion/rhinorrhea, chest pain, SOB/dyspnea, abdominal pain, N/V/C, dysuria/hematuria, hallucinations  Reports he is interested in outpatient resources at this time on discharge  Objective:     Clinical Opiate Withdrawal Scale  Pulse: 88    SEWS Total Score: 0 (3/10/2022  4:00 AM)    Last 24 Hours Medication List:   Current Facility-Administered Medications   Medication Dose Route Frequency Provider Last Rate    acetaminophen  650 mg Oral Q6H PRN Obioma Deisy Parsons, PA-C      enoxaparin  40 mg Subcutaneous Daily Obioma Deisy Parsons, PA-C      folic acid IVPB  1 mg Intravenous Daily Obioma Deisy Parsons, PA-C 1 mg (03/10/22 0813)    ketamine  0 1 mg/kg/hr Intravenous Continuous Coni Defrancisco, PA-C 0 1 mg/kg/hr (22 1018)    Lidocaine Viscous HCl  15 mL Swish & Spit 4x Daily PRN Coni Defrancisco, PA-C      loperamide  2 mg Oral 4x Daily PRN Coni Defrancisco, PA-C      LORazepam  2 mg Intravenous Once PRN Coni Defrancisco, PA-C      multivitamin-minerals  1 tablet Oral Daily Obioma Deisy Parsons, PA-C      ondansetron  4 mg Intravenous Q6H PRN Obioma Deisy Parsons, PA-C      PHENobarbital  130 mg Intravenous Once Macon Shown, DO      sodium chloride  125 mL/hr Intravenous Continuous Obioma Deisy Parsons, PA-C 125 mL/hr (03/10/22 9451)    thiamine  500 mg Intravenous Q8H Obioma Deisy Parsons, PA-C 500 mg (03/10/22 1128)    traZODone  50 mg Oral HS PRN Obioma Deisy Parsons, PA-C           Vitals:   Temp (24hrs), Av 3 °F (36 8 °C), Min:97 5 °F (36 4 °C), Max:99 8 °F (37 7 °C)    Temp:  [97 5 °F (36 4 °C)-99 8 °F (37 7 °C)] 97 5 °F (36 4 °C)  HR:  [] 88  Resp:  [16-20] 18  BP: (108-160)/(60-89) 143/86  SpO2:  [97 %-100 %] 98 %  Body mass index is 24 26 kg/m²  Input and Output Summary (last 24 hours):     Intake/Output Summary (Last 24 hours) at 3/10/2022 1200  Last data filed at 3/10/2022 0800  Gross per 24 hour   Intake 1248 4 ml   Output 525 ml   Net 723 4 ml       Physical Exam:   Physical Exam  Vitals and nursing note reviewed  Constitutional:       General: He is not in acute distress  Appearance: Normal appearance  He is well-developed and normal weight  He is ill-appearing and diaphoretic  HENT:      Head: Normocephalic and atraumatic  Mouth/Throat:      Tongue: Lesions (2 bite wounds noted on tip of tongue) present  Eyes:      General: No scleral icterus  Extraocular Movements: Extraocular movements intact  Right eye: Nystagmus present  Left eye: Nystagmus present  Conjunctiva/sclera: Conjunctivae normal       Pupils: Pupils are equal, round, and reactive to light  Cardiovascular:      Rate and Rhythm: Normal rate and regular rhythm  Pulses: Normal pulses  Heart sounds: Normal heart sounds  No murmur heard  No friction rub  No gallop  Pulmonary:      Effort: Pulmonary effort is normal  No respiratory distress  Breath sounds: Normal breath sounds  No wheezing, rhonchi or rales  Abdominal:      General: Abdomen is flat  Bowel sounds are normal  There is no distension  Palpations: Abdomen is soft  Tenderness: There is no abdominal tenderness  There is no guarding  Musculoskeletal:         General: Normal range of motion  Cervical back: Normal range of motion and neck supple  Right lower leg: No edema  Left lower leg: No edema  Skin:     General: Skin is warm  Capillary Refill: Capillary refill takes less than 2 seconds  Neurological:      General: No focal deficit present  Mental Status: He is alert and oriented to person, place, and time  Mental status is at baseline  Motor: Tremor (b/l intention tremor) present  Coordination: Finger-Nose-Finger Test abnormal (mild ataxia)   Heel to Durel Sprung Test normal    Psychiatric:         Attention and Perception: Attention normal          Mood and Affect: Mood is anxious  Speech: Speech normal          Behavior: Behavior is cooperative  Additional Data:     Labs: keep all most recent labs as listed on admission templates   Results from last 7 days   Lab Units 03/10/22  0624 03/09/22  0727 03/08/22  1708 03/08/22  1214 03/08/22  1214   WBC Thousand/uL 4 80   < > 7 10   < > 9 77   HEMOGLOBIN g/dL 13 3*   < > 12 0*   < > 13 8   HEMATOCRIT % 38 5*   < > 34 6*   < > 41 4   PLATELETS Thousands/uL 72*   < > 74*   < > 131*   NEUTROS PCT %  --   --   --   --  74   LYMPHS PCT %  --   --   --   --  13*   LYMPHO PCT %  --   --  4*  --   --    MONOS PCT %  --   --   --   --  10   MONO PCT %  --   --  15*  --   --    EOS PCT %  --   --   --   --  0    < > = values in this interval not displayed  Results from last 7 days   Lab Units 03/10/22  0624   SODIUM mmol/L 129*   POTASSIUM mmol/L 5 1*   CHLORIDE mmol/L 99   CO2 mmol/L 18*   BUN mg/dL 6   CREATININE mg/dL 0 39*   ANION GAP mmol/L 12   CALCIUM mg/dL 7 0*   ALBUMIN g/dL 4 1   TOTAL BILIRUBIN mg/dL 1 69*   ALK PHOS U/L 127*   ALT U/L 187*   AST U/L 304*   GLUCOSE RANDOM mg/dL 75      Results from last 7 days   Lab Units 03/08/22  1213   INR  0 94                Results from last 7 days   Lab Units 03/09/22  0727 03/08/22  1708 03/08/22  1213   LACTIC ACID mmol/L 0 7 1 4 16 6*          * I Have Reviewed All Lab Data Listed Above  * Additional Pertinent Lab Tests Reviewed: All Labs Within Last 24 Hours Reviewed      Imaging Studies: I have personally reviewed pertinent reports  Recent Cultures (last 7 days): Today, Patient Was Seen By: Dontae Berger PA-C    ** Please Note: Dictation voice to text software may have been used in the creation of this document   **

## 2022-03-10 NOTE — PROGRESS NOTES
03/10/22 1045   Referral Data   Referral Source Family   Referral Reason Drug/Alcohol [facility]   Readmission Root Cause   30 Day Readmission No   Patient Information   Mental Status Confused   Primary Caregiver Self   Support System Immediate family; Other (Comment)  (significant other, AA friends)   Gnosticist/Cultural Requests n/a   Legal Information   Legal Issues pt reprots he is currently on LOKESH in [place] [address] for DUI, pending court date to confirm LOKESH  Health Care Proxy Appointed No   Activities of Daily Living Prior to Admission   Functional Status Independent   Assistive Device No device needed   Living Arrangement Apartment   Ambulation Independent   Access to Firearms   Access to Firearms No  (pt denies)   [address] 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[employer])   [employer] of [employer] to App: Drives Self     Pt is a 29year old single male who was admitted to detox for alcohol withdrawal  [facility] via EMS after experiencing a withdrawal seizure at home  Pt's name, date of birth, home address, and telephone number were verified  Pt was informed of case management role and the purpose of the completion of intake with case management  Pt corrected his telephone number and this was added  Pt presented as cooperative but somewhat confused  Pt reports he had been drinking 2 handles of vodka daily for the last 5 years  Pt reports last drink 3/6/2022 of 1 handle ending at 12am and first use at age 27  Pt reports drug abuse from 17 to 27, heroin, cocaine, methamphetamines, and crack cocaine  Pt reports abstinence of 1 year attending outpatient and 12 step meeting  Pt reports 6 inpatient treatments, [facility] 10/21, outpatient treatment, last 10 years ago, and 12 step meetings  Pt has current withdrawal symptom of tremors, Dt's, ambulatory dysfunction, and sweats   Pt reports this current seizure was his only withdrawal seizure  Pt reports very few blackout drinking episodes  Pt reports a family history of substance abuse, paternal grandfather-alcohol  AUDIT: no score  PAWSS: 4  UDS: + wilma ( due to medical intervention)  Ethanol: not completed    Pt reports a diagnosis of anxiety  Records indicate a diagnosis of MDD  Pt denies current treatment  Pt reports a history outpatient psychiatric, last 2 years ago, and inpatient treatment, [address] [facility] 2016 due to delusional behavior due to substance abuse  Pt denied any current SI or HI, denied AH/VH, denied hx of trauma or abuse, denied access to firearms  Pt reports family history of mental health- mother bipolar  Pt denied current chronic medical conditions but reports a fall during seizure for which he has some minor injuries  Pt denied any current PCP  Pt signed an EDNA for [address] associates for referral of PCP  Pt has current medical insurance of [address], EDNA signed, and preferred pharmacy of [facility]  Pt has current pending hearing for entrance to [facility]OKESH program for a DUI in [facility]  Pt requested forms indicating that he is admitted be emailed to his father  Mauricio created a letter on letter head indicating pt admission and emailed to pt's father [email or web address]  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  Pt reports a bachelors degree  Pt reports he currently has a license and a car  Pt denied  service  Pt denied religous or cultural request     Pt reports he resides alone in an apartment  Pt signed an EDNA for his father, Maria Esther Garcia, and he was contacted [phone]  Pt's father agreed to receive pt documentation regarding pt's admission  Pt's father stated he would like pt to enter inpatient treatment  Cm explained pt's preference for outpatient  Pt's father stated he will discuss this with pt when pt is feeling better      Pt and Cm completed relapse prevention plan  Pt and Cm signed plan and pt received a copy of this plan  Pt was able to identify both triggers and coping skills  Pt indicates a desire to enter outpatient treatment at this time  CM provided education regarding the benefits of inpatient treatment at this time due to pt's substantial alcohol use  Pt maintained he wanted outpatient treatment  Pt states he will also return to 12 step meetings  Pt signed an EDNA for [facility] drug and alcohol  Cm contacted [facility] at [phone] and spoke with Adrián Camacho was transferred to pt's room to complete pt interview  Adrián Camacho indicates she will return contact this writer to provide information regarding intake appointment  Pt's clinical presentation indicates pt does not recognize the severity and acuity of his substance use and pt does not recognize the relapse potential  Pt would benefit from inpatient substance use rehab treatment at this time  Pt's goals for detox are to successfully complete medical withdrawal and develop a discharge plan that addresses pt's relapse potential  Pt presents in the contemplation stage of change

## 2022-03-10 NOTE — ASSESSMENT & PLAN NOTE
· CBC this AM revealed improvement of all cell lines: hgb 13 3, MCV 93, WBCs 4 80, platelets 72  · Likely chronic 2/2 myelosuppression from chronic alcohol consumption  · No acute signs of active bleeding  · Hemodynamically stable   · Continue to monitor and repeat CBC in AM  · Transfuse as indicated  · Will discontinue lovenox if platelets decrease <06

## 2022-03-10 NOTE — ASSESSMENT & PLAN NOTE
· Patient transferred from [address] [place] [address] for alcohol withdrawal on 3/8/2022  · received 8 mg of Ativan IV and 650 mg of phenobarbital PTA  · Patient with history of daily alcohol consumption (1 gal of vodka daily)  · Last drank 2:00 p m on 03/07/2022  · Attempted alcohol cessation resulting in withdrawal seizure  · Suspect complication of delirium tremens  · Continue SEWS protocol for medically-assisted alcohol withdrawal  · Patient has thus far received 2080 mg phenobarbital  · Mental status improved this morning- currently A&Ox3, no hallucinations  · Exhibiting intentional tremor, diaphoretic, mildly tachycardic  · Continue ketamine infusion- started yesterday at 0 1 mg/kg/hr- maintained at 0 1 mg/kg/hr  · Will continue SEWS with phenobarbital until max of 3 g  · Consider future titration of ketamine PRN if max 3 g phenobarbital reached   · Continue telemetry monitoring

## 2022-03-10 NOTE — ASSESSMENT & PLAN NOTE
· History of depression, currently on Effexor  · Currently denies SI, thoughts of self-harm  · Continue to hold effexor for now

## 2022-03-10 NOTE — ASSESSMENT & PLAN NOTE
· CT abd/pelvis 3/8/2022 revealed enlarged fatty liver, no ascites   · CMP this AM revealed elevated but down-trending LFTS  ·  -> 304  ·  -> 187  · Alk phos 153 -> 127  · Total bilirubin 2 18 -> 1 69  · Above findings 2/2 chronic alcohol consumption   · Continue to monitor LFTs, repeat CMP in AM  · Encourage alcohol cessation

## 2022-03-10 NOTE — ASSESSMENT & PLAN NOTE
· CMP this AM revealed mild hyperkalemia with K+ 5 1  · Was 3 3 yesterday, received supplementation  · Specimen was hemolyzed- likely contributing to mild elevation   · Continue to monitor electrolytes and replete as necessary

## 2022-03-10 NOTE — ASSESSMENT & PLAN NOTE
· CMP this AM revealed mild hyponatremia- sodium 129, down from 133 yesterday   · Continue IVFs  · Continue to monitor

## 2022-03-10 NOTE — ASSESSMENT & PLAN NOTE
· Initial lipase 440 in ED, increased to 888 on admission  · Repeat lipase this  (down-trending, was 434 yesterday)  · Likely a sequela from alcohol consumption  · CT abd/pelvis: fatty liver, pancreas unremarkable, no calcified gallstones, no pericholecystic   · Currently denies abdominal pain  · No grimace on palpation of the abdomen, no TTP, no guarding  · Routine monitoring

## 2022-03-11 VITALS
OXYGEN SATURATION: 98 % | SYSTOLIC BLOOD PRESSURE: 155 MMHG | WEIGHT: 141.31 LBS | DIASTOLIC BLOOD PRESSURE: 108 MMHG | HEART RATE: 86 BPM | TEMPERATURE: 97.5 F | RESPIRATION RATE: 14 BRPM | BODY MASS INDEX: 24.13 KG/M2 | HEIGHT: 64 IN

## 2022-03-11 PROBLEM — F10.231 DELIRIUM TREMENS (HCC): Status: RESOLVED | Noted: 2022-03-08 | Resolved: 2022-03-11

## 2022-03-11 PROBLEM — R74.8 ABNORMAL SERUM LEVEL OF LIPASE: Status: RESOLVED | Noted: 2022-03-08 | Resolved: 2022-03-11

## 2022-03-11 PROBLEM — F10.239 ALCOHOL WITHDRAWAL SEIZURE (HCC): Status: RESOLVED | Noted: 2022-03-08 | Resolved: 2022-03-11

## 2022-03-11 PROBLEM — E87.5 HYPERKALEMIA: Status: RESOLVED | Noted: 2022-03-09 | Resolved: 2022-03-11

## 2022-03-11 PROBLEM — E87.1 HYPONATREMIA: Status: RESOLVED | Noted: 2022-03-09 | Resolved: 2022-03-11

## 2022-03-11 PROBLEM — S09.93XA DENTAL INJURY: Status: ACTIVE | Noted: 2022-03-11

## 2022-03-11 PROBLEM — R56.9 ALCOHOL WITHDRAWAL SEIZURE (HCC): Status: RESOLVED | Noted: 2022-03-08 | Resolved: 2022-03-11

## 2022-03-11 LAB
ALBUMIN SERPL BCP-MCNC: 4.1 G/DL (ref 3–5.2)
ALP SERPL-CCNC: 149 U/L (ref 43–122)
ALT SERPL W P-5'-P-CCNC: 177 U/L
ANION GAP SERPL CALCULATED.3IONS-SCNC: 7 MMOL/L (ref 5–14)
AST SERPL W P-5'-P-CCNC: 212 U/L (ref 17–59)
BILIRUB SERPL-MCNC: 0.85 MG/DL
BUN SERPL-MCNC: 5 MG/DL (ref 5–25)
CALCIUM SERPL-MCNC: 8.5 MG/DL (ref 8.4–10.2)
CHLORIDE SERPL-SCNC: 101 MMOL/L (ref 97–108)
CO2 SERPL-SCNC: 26 MMOL/L (ref 22–30)
CREAT SERPL-MCNC: 0.44 MG/DL (ref 0.7–1.5)
ERYTHROCYTE [DISTWIDTH] IN BLOOD BY AUTOMATED COUNT: 15.6 %
GFR SERPL CREATININE-BSD FRML MDRD: 153 ML/MIN/1.73SQ M
GLUCOSE SERPL-MCNC: 85 MG/DL (ref 70–99)
HCT VFR BLD AUTO: 39.4 % (ref 41–53)
HGB BLD-MCNC: 13.5 G/DL (ref 13.5–17.5)
LIPASE SERPL-CCNC: 347 U/L (ref 23–300)
MAGNESIUM SERPL-MCNC: 1.4 MG/DL (ref 1.6–2.3)
MCH RBC QN AUTO: 31.8 PG (ref 26–34)
MCHC RBC AUTO-ENTMCNC: 34.2 G/DL (ref 31–36)
MCV RBC AUTO: 93 FL (ref 80–100)
PLATELET # BLD AUTO: 108 THOUSANDS/UL (ref 150–450)
PMV BLD AUTO: 8 FL (ref 8.9–12.7)
POTASSIUM SERPL-SCNC: 3.6 MMOL/L (ref 3.6–5)
PROT SERPL-MCNC: 7.3 G/DL (ref 5.9–8.4)
RBC # BLD AUTO: 4.23 MILLION/UL (ref 4.5–5.9)
SODIUM SERPL-SCNC: 134 MMOL/L (ref 137–147)
WBC # BLD AUTO: 4.4 THOUSAND/UL (ref 4.5–11)

## 2022-03-11 PROCEDURE — 83690 ASSAY OF LIPASE: CPT | Performed by: PHYSICIAN ASSISTANT

## 2022-03-11 PROCEDURE — 80053 COMPREHEN METABOLIC PANEL: CPT | Performed by: PHYSICIAN ASSISTANT

## 2022-03-11 PROCEDURE — 99239 HOSP IP/OBS DSCHRG MGMT >30: CPT | Performed by: EMERGENCY MEDICINE

## 2022-03-11 PROCEDURE — NC001 PR NO CHARGE: Performed by: EMERGENCY MEDICINE

## 2022-03-11 PROCEDURE — 83735 ASSAY OF MAGNESIUM: CPT | Performed by: PHYSICIAN ASSISTANT

## 2022-03-11 PROCEDURE — 85027 COMPLETE CBC AUTOMATED: CPT | Performed by: PHYSICIAN ASSISTANT

## 2022-03-11 RX ORDER — GABAPENTIN 300 MG/1
300 CAPSULE ORAL 3 TIMES DAILY
Qty: 21 CAPSULE | Refills: 0 | Status: SHIPPED | OUTPATIENT
Start: 2022-03-11 | End: 2022-03-18

## 2022-03-11 RX ORDER — NALTREXONE HYDROCHLORIDE 50 MG/1
50 TABLET, FILM COATED ORAL DAILY
Qty: 30 TABLET | Refills: 0 | Status: SHIPPED | OUTPATIENT
Start: 2022-03-11 | End: 2022-04-10

## 2022-03-11 RX ADMIN — ENOXAPARIN SODIUM 40 MG: 100 INJECTION SUBCUTANEOUS at 08:19

## 2022-03-11 RX ADMIN — FOLIC ACID 1 MG: 5 INJECTION, SOLUTION INTRAMUSCULAR; INTRAVENOUS; SUBCUTANEOUS at 08:19

## 2022-03-11 RX ADMIN — THIAMINE HYDROCHLORIDE 500 MG: 100 INJECTION, SOLUTION INTRAMUSCULAR; INTRAVENOUS at 02:53

## 2022-03-11 RX ADMIN — MULTIPLE VITAMINS W/ MINERALS TAB 1 TABLET: TAB ORAL at 08:19

## 2022-03-11 RX ADMIN — SODIUM CHLORIDE 125 ML/HR: 9 INJECTION, SOLUTION INTRAVENOUS at 02:03

## 2022-03-11 NOTE — PROGRESS NOTES
Progress Note - Medical Toxicology    Antonina [place] 29 y o  male MRN: [id number]  Unit/Bed#: 5T DETOX 504-01 Encounter: [id number]  [address] [place] [address], LEVEL 4  Department of Medical Toxicology  Reason for Admission/Principal Problem:  ETOH withdrawal, delirium tremens  Rounding Provider: MAURY Elliott, Lynette Montgomery, DO       * Delirium tremens [facility])  Assessment & Plan  · Patient transferred from [address] [place] [address] for alcohol withdrawal on 3/8/2022  · received 8 mg of Ativan IV and 650 mg of phenobarbital PTA  · Patient with history of daily alcohol consumption (1 gal of vodka daily)  · Last drank 2:00 p m on 03/07/2022  · Attempted alcohol cessation resulting in withdrawal seizure  · Suspect complication of delirium tremens  · Discontinue SEWS protocol for medically-assisted alcohol withdrawal  · Patient has thus far received 2080 mg phenobarbital  · Mental status improved this morning- currently A&Ox3, no hallucinations  · Improved intentional tremor, non diaphoretic, tachycardia improved  · Continue ketamine infusion- started yesterday at 0 1 mg/kg/hr- maintained at 0 1 mg/kg/hr, consider discontinuation today  · Discontinue SEWS protocol, has not required any further dosing phenobarbital  · Continue telemetry monitoring     Alcohol withdrawal seizure ([facility] [address] )  Assessment & Plan  · Patient with known history of daily alcohol consumption (1 gallon vodka daily)  · Witnessed generalized tonic-clonic seizure per reports upon alcohol cessation  · No known previous history of seizures or alcohol withdrawal seizure  · Imaging studies obtained prior to transfer: CT scan of the head was negative for any bleed with no aspiration on chest x-ray  · CT cervical spine and CT facial bones obtained upon admission 3/8/2022- both negative for acute abnormalities  · Exam positive for sequela from his seizure activity with noticeable dry oral and nasal blood  · Continue seizure precautions · Alcohol withdrawal improving, SEWS has been discontinued    Ketamine infusion still being administered    Alcohol use disorder, severe, dependence (HCC)  Assessment & Plan  · Admits daily alcohol use for several years  · Associated new onset withdrawal seizure from alcohol cessation  · Admits daily consumption of 1 gal of vodka per day; last drank vodka at 2:00 p m 03/07/2022  · No known history of detox/rehab treatment in the past  · continue high dose thiamine 713VR [id number] and Folic acid 1mg daily IV  · consult case management for disposition planning    Hyponatremia  Assessment & Plan  · CMP this AM revealed mild hyponatremia- sodium 129, down from 133 yesterday   · Continue IVFs  · Continue to monitor    Hyperkalemia  Assessment & Plan  · CMP this AM revealed mild hyperkalemia with K+ 5 1  · Was 3 3 yesterday, received supplementation  · Specimen was hemolyzed- likely contributing to mild elevation   · Continue to monitor electrolytes and replete as necessary     Pancytopenia (HCC)  Assessment & Plan  · CBC this AM revealed improvement of all cell lines: hgb 13 5, MCV 93, WBCs 4 40, platelets 967 (platelets improved)  · Likely chronic 2/2 myelosuppression from chronic alcohol consumption  · No acute signs of active bleeding  · Hemodynamically stable   · Continue to monitor and repeat CBC in AM  · Transfuse as indicated  · Will discontinue lovenox if platelets decrease <41     Major depressive disorder  Assessment & Plan  · History of depression, currently on Effexor  · Currently denies SI, thoughts of self-harm  · Continue to hold effexor for now    Abnormal serum level of lipase  Assessment & Plan  · Initial lipase 440 in ED, increased to 888 on admission  · Repeat lipase this  (down-trending, was 434 yesterday)  · Likely a sequela from alcohol consumption  · CT abd/pelvis: fatty liver, pancreas unremarkable, no calcified gallstones, no pericholecystic   · Currently denies abdominal pain  · No grimace on palpation of the abdomen, no TTP, no guarding  · Routine monitoring     Alcoholic fatty liver  Assessment & Plan  · CT abd/pelvis 3/8/2022 revealed enlarged fatty liver, no ascites   · CMP this AM revealed elevated but down-trending LFTS  ·  -> 304  ·  -> 187  · Alk phos 153 -> 127  · Total bilirubin 2 18 -> 1 69  · Above findings 2/2 chronic alcohol consumption   · Continue to monitor LFTs, repeat CMP in AM  · Encourage alcohol cessation          VTE Pharmacologic Prophylaxis:   Pharmacologic: Enoxaparin (Lovenox)  Mechanical VTE Prophylaxis in Place: yes    Code Status: Level 1 - Full Code    Patient Centered Rounds: I have performed bedside rounds with nursing staff today  Discussions with Specialists or Other Care Team Provider:  Nursing   Education and Discussions with Family / Patient:  Discussion with patient today    Time Spent for Care: 30 minutes  More than 50% of total time spent on counseling and coordination of care as described above  Current Length of Stay: 3 day(s)    Current Patient Status: Inpatient     Certification Statement: The patient will continue to require additional inpatient hospital stay due to Medical management of alcohol withdrawal Discharge Plan:  Patient intends discharge to outpatient treatment  Anticipate further monitoring while on ketamine infusion  Total time spent today 30 minutes  Greater than 50% of total time was spent with the patient and / or family counseling and / or coordination of care  A description of the counseling / coordination of care: Medical management of alcohol withdrawal    Subjective:   Patient reports improved symptoms of alcohol withdrawal today  He reports improvement in tremor and denies any diaphoresis, chest pain, palpitations, shortness of breath, or any visual disturbances  He is denying any abdominal pain, tenderness, N/V/D  Reports he still interested in outpatient resources at time of discharge      Objective: Clinical Opiate Withdrawal Scale  Pulse: 86    SEWS Total Score: 0 (3/11/2022  6:00 AM)        Last 24 Hours Medication List:   Current Facility-Administered Medications   Medication Dose Route Frequency Provider Last Rate    acetaminophen  650 mg Oral Q6H PRN ObEMMA Bah-HEAVEN      al mag oxide-diphenhydramine-lidocaine viscous  10 mL Swish & Spit Q6H PRN Coni Defranciiraj PA-C      enoxaparin  40 mg Subcutaneous Daily Obioma EMMA Schwartz-C      folic acid IVPB  1 mg Intravenous Daily Obioma EMMA Schwartz-C 1 mg (03/10/22 0813)    ketamine  0 1 mg/kg/hr Intravenous Continuous Coni Lakeishaciiraj, PA-C 0 1 mg/kg/hr (22 1018)    loperamide  2 mg Oral 4x Daily PRN Coni Lakeishaciiraj PA-C      LORazepam  2 mg Intravenous Once PRN Coni Defhoseacisco, PA-C      multivitamin-minerals  1 tablet Oral Daily Obchelsi Parsons PA-C      ondansetron  4 mg Intravenous Q6H PRN Obchelsi Parsons PA-C      sodium chloride  125 mL/hr Intravenous Continuous Obioma Deisy Parsons PA-C 125 mL/hr (22 0203)    thiamine  500 mg Intravenous Q8H Obchelsi Parsons PA-C 500 mg (22 0253)    traZODone  50 mg Oral HS PRN Obioma EMMA Schwartz-C           Vitals:   Temp (24hrs), Av 1 °F (36 7 °C), Min:97 5 °F (36 4 °C), Max:98 8 °F (37 1 °C)    Temp:  [97 5 °F (36 4 °C)-98 8 °F (37 1 °C)] 98 8 °F (37 1 °C)  HR:  [] 86  Resp:  [16-18] 18  BP: (103-154)/(61-95) 137/95  SpO2:  [96 %-99 %] 98 %  Body mass index is 24 26 kg/m²  Input and Output Summary (last 24 hours): Intake/Output Summary (Last 24 hours) at 3/11/2022 8196  Last data filed at 3/10/2022 1700  Gross per 24 hour   Intake 240 ml   Output --   Net 240 ml       Physical Exam:   Physical Exam  Vitals and nursing note reviewed  Constitutional:       General: He is not in acute distress  Appearance: Normal appearance  He is well-developed and normal weight   He is not ill-appearing or diaphoretic  HENT:      Head: Normocephalic and atraumatic  Mouth/Throat:      Tongue: Lesions (2 bite wounds noted on tip of tongue) present  Eyes:      General: No scleral icterus  Extraocular Movements: Extraocular movements intact  Right eye: No nystagmus  Left eye: No nystagmus  Conjunctiva/sclera: Conjunctivae normal       Pupils: Pupils are equal, round, and reactive to light  Cardiovascular:      Rate and Rhythm: Normal rate and regular rhythm  Pulses: Normal pulses  Heart sounds: Normal heart sounds  No murmur heard  No friction rub  No gallop  Pulmonary:      Effort: Pulmonary effort is normal  No respiratory distress  Breath sounds: Normal breath sounds  No wheezing, rhonchi or rales  Abdominal:      General: Abdomen is flat  Bowel sounds are normal  There is no distension  Palpations: Abdomen is soft  Tenderness: There is no abdominal tenderness  There is no guarding  Musculoskeletal:         General: Normal range of motion  Cervical back: Normal range of motion and neck supple  Right lower leg: No edema  Left lower leg: No edema  Skin:     General: Skin is warm  Capillary Refill: Capillary refill takes less than 2 seconds  Neurological:      General: No focal deficit present  Mental Status: He is alert and oriented to person, place, and time  Mental status is at baseline  Motor: No tremor (b/l intention tremor improved)  Coordination: Finger-Nose-Finger Test (mild ataxia) and Heel to Shin Test normal    Psychiatric:         Attention and Perception: Attention normal          Mood and Affect: Mood is not anxious  Speech: Speech normal          Behavior: Behavior is cooperative           Additional Data:     Labs: keep all most recent labs as listed on admission templates   Results from last 7 days   Lab Units 03/11/22  0638 03/09/22  0727 03/08/22  1708 03/08/22  1214 03/08/22  1214   WBC Thousand/uL 4 40*   < > 7 10   < > 9 77   HEMOGLOBIN g/dL 13 5   < > 12 0*   < > 13 8   HEMATOCRIT % 39 4*   < > 34 6*   < > 41 4   PLATELETS Thousands/uL 108*   < > 74*   < > 131*   NEUTROS PCT %  --   --   --   --  74   LYMPHS PCT %  --   --   --   --  13*   LYMPHO PCT %  --   --  4*  --   --    MONOS PCT %  --   --   --   --  10   MONO PCT %  --   --  15*  --   --    EOS PCT %  --   --   --   --  0    < > = values in this interval not displayed  Results from last 7 days   Lab Units 03/10/22  0624   SODIUM mmol/L 129*   POTASSIUM mmol/L 5 1*   CHLORIDE mmol/L 99   CO2 mmol/L 18*   BUN mg/dL 6   CREATININE mg/dL 0 39*   ANION GAP mmol/L 12   CALCIUM mg/dL 7 0*   ALBUMIN g/dL 4 1   TOTAL BILIRUBIN mg/dL 1 69*   ALK PHOS U/L 127*   ALT U/L 187*   AST U/L 304*   GLUCOSE RANDOM mg/dL 75      Results from last 7 days   Lab Units 03/08/22  1213   INR  0 94                Results from last 7 days   Lab Units 03/09/22  0727 03/08/22  1708 03/08/22  1213   LACTIC ACID mmol/L 0 7 1 4 16 6*          * I Have Reviewed All Lab Data Listed Above  * Additional Pertinent Lab Tests Reviewed: Zamzam 66 Admission Reviewed      Imaging Studies: I have personally reviewed pertinent reports  Recent Cultures (last 7 days): Today, Patient Was Seen By: MAURY Moreau    ** Please Note: Dictation voice to text software may have been used in the creation of this document   **

## 2022-03-11 NOTE — NURSING NOTE
Patient refusing Telemety and pulse ox  States he does not want it on anymore  He was also attempting to unplug his IV  He states he was told his medication was going to be turned off  He was educated he had to keep it on until nursing staff removed it

## 2022-03-11 NOTE — PROGRESS NOTES
03/11/22 1057   Medication Infusion Time Out   Med Infusion Time Out Performed   (wasted 230mL)   IV Medication Infusion Drug Name Ketamine drip   medication wasted in [address] with Mable Person RN

## 2022-03-11 NOTE — NURSING NOTE
Patient cursing about wanting to leave, redirected by this nurse  He curses at his significant other on the phone to saying "come fucking pick me up "  He removed his IV himself    Tells this nurse "well don't be mad at me "

## 2022-03-11 NOTE — DISCHARGE INSTR - OTHER ORDERS
Outpatient Drug & Alcohol Treatment   [facility] currently operates an outpatient treatment unit:  [facility] in [address], [place] as a functional unit of the [facility]  The Outpatient treatment units are licensed by the [place] Department of Drug & Alcohol Programs to provide individual and group counseling for those with substance abuse and dependency problems  The clinical staff is experienced in a variety of therapeutic modalities and provides treatment that is individualized to meet the particular needs of each person  These units are drug-free treatment programs  [facility] accepts most major healthcare insurance coverage plans, [place] Medical Assistance and in those cases where the consumer has no third party healthcare coverage, a liberal sliding fee schedule is utilized  The length of service and type of outpatient service provided is based on the results of the Level of Care Assessment            There are currently three treatment protocols available: Outpatient  Intensive Outpatient  Contracted services for Partial Hospitalization  Therapy is provided in both Individual and Group counseling formats  The Outpatient department offers individual counseling for the family members of substance abusers to address co-dependent and enabling behaviors      Outpatient treatment services in [facility] are purchased through a fee-for-service subcontract with:  [place] [facility]  [address] [facility], [address]   Phone: [phone], [address] [facility] [address] [facility], [address]   New Admissions [phone]  Local office [phone]    Outpatient treatment services in [facility] are purchased through fee-for-service subcontracts with:  [address]  12 [facility] [address]  Phone: [phone], [address]  Phone: [address]  [phone] / [address] [phone]  Outpatient treatment services are also available to [facility] residents in our [facility]

## 2022-03-11 NOTE — PLAN OF CARE
Problem: SUBSTANCE USE/ABUSE  Goal: By discharge, will develop insight into their chemical dependency and sustain motivation to continue in recovery  Description: INTERVENTIONS:  - Attends all daily group sessions and scheduled AA groups  - Actively practices coping skills through participation in the therapeutic community and adherence to program rules  - Reviews and completes assignments from individual treatment plan  - Assist patient development of understanding of their personal cycle of addiction and relapse triggers  Outcome: Progressing  Goal: By discharge, patient will have ongoing treatment plan addressing chemical dependency  Description: INTERVENTIONS:  - Assist patient with resources and/or appointments for ongoing recovery based living  Outcome: Progressing     Problem: SUBSTANCE USE/ABUSE  Goal: By discharge, will develop insight into their chemical dependency and sustain motivation to continue in recovery  Description: INTERVENTIONS:  - Attends all daily group sessions and scheduled AA groups  - Actively practices coping skills through participation in the therapeutic community and adherence to program rules  - Reviews and completes assignments from individual treatment plan  - Assist patient development of understanding of their personal cycle of addiction and relapse triggers  Outcome: Progressing  Goal: By discharge, patient will have ongoing treatment plan addressing chemical dependency  Description: INTERVENTIONS:  - Assist patient with resources and/or appointments for ongoing recovery based living  Outcome: Progressing

## 2022-03-11 NOTE — PROGRESS NOTES
03/10/22 2011   Hospital Based Inpatient Psych Services/ Substance Use - for patients on the behavioral health units only   Patient was referred to an addiction treatment center Yes   Sepsis Screening (Must be performed on all patients over 25years of age) **Use most recent VS and lab values available within previous 24 hours for your screen**Notify Physician of new/acute/suspected infection   Indicate SIRS criteria Tachycardia > 90 bpm   Two (2) or more SIRS criteria present? No   Physician Notified? No   Sepsis Protocol Initiated? No   Discussed with patient: AUDIT score of 17 UDS/Identified Substance(s) used: Denies (UDS + Barbituates)  Risks discussed included: Dependence, Withdrawal, Health Risks- Hyponatremia, Worsening Hypertension  Recommendations discussed: Inpatient D&A inpatient  Patient's response: Declined inpatient, agreeable to outpatient  Outpatient arranged, resources provided, PCP established

## 2022-03-11 NOTE — DISCHARGE SUMMARY
MEDICAL DETOX UNIT, LEVEL 4  Department of Medical Toxicology  Reason for Admission/Principal Problem:  Delirium tremens  Admitting provider: DO Kenna Traylor DO   3/8/2022  4:25 PM       Discharging Physician / Practitioner: Imelda Rousseau DO  PCP: No primary care provider on file  Admission Date:   Admission Orders (From admission, onward)     Ordered        03/08/22 1704  Inpatient Admission  Once                      Discharge Date: 03/11/22    Medical Problems             Resolved Problems  Date Reviewed: 3/10/2022          Resolved    Alcoholic ketoacidosis 3/9/5362     Resolved by  Christelle Keane PA-C                Dental injury  Assessment & Plan  F/u dentist     Pancytopenia [facility])  Assessment & Plan  · Follow-up as outpatient    Major depressive disorder  Assessment & Plan  · Continue outpatient medications    Alcoholic fatty liver  Assessment & Plan  Follow-up with family doctor    Alcohol use disorder, severe, dependence ([facility] [address] )  Assessment & Plan  · Continue naltrexone  · One week of gabapentin prescribed for post acute withdrawal syndrome    Hyponatremia-resolved as of 3/11/2022  Assessment & Plan  · CMP this AM revealed mild hyponatremia- sodium 129, down from 133 yesterday   · Continue IVFs  · Continue to monitor    Hyperkalemia-resolved as of 3/11/2022  Assessment & Plan  · CMP this AM revealed mild hyperkalemia with K+ 5 1  · Was 3 3 yesterday, received supplementation  · Specimen was hemolyzed- likely contributing to mild elevation   · Continue to monitor electrolytes and replete as necessary     Abnormal serum level of lipase-resolved as of 3/11/2022  Assessment & Plan  No current abdominal pain  Tolerating p o      Alcohol withdrawal seizure (HCC)-resolved as of 3/11/2022  Assessment & Plan  · No recurrences    * Delirium tremens (HCC)-resolved as of 3/11/2022  Assessment & Plan  · Resolved      Consultations During Hospital Stay:  · na    Procedures Performed:   · na    Significant Findings / Test Results:   · Steatosis  · Pancytopenia  · Elevated lipase    Incidental Findings:   · na     Test Results Pending at Discharge (will require follow up):   · na     Outpatient Tests Requested:  · na    Reason for Admission:  Delirium tremens and alcohol withdrawal seizure    Hospital Course:     Perez Cheung is a 29 y o  male patient who originally presented to the hospital on 3/8/2022 due to alcohol withdrawal seizure and delirium tremens  He was treated on SEWS protocol in the detox unit, requiring multiple doses phenobarbital and a ketamine infusion  Ultimately he improved but had some residual ataxic gait associated with heavy alcohol use  I encouraged the patient to stay in detox for 1 more night and to then potentially transition to inpatient rehab but he was 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  Outpatient resources were arranged  He is alert and oriented x4 and able to ambulate independently around the unit without any difficulty so he was discharged upon his request       Please see above list of diagnoses and related plan for additional information  Condition at Discharge: fair     Discharge Day Visit / Exam:     Subjective:  Patient feels well  He is tolerating p o  He is ambulating well  Vitals: Blood Pressure: 152/96 (03/11/22 1502)  Pulse: 87 (03/11/22 1502)  Temperature: 98 8 °F (37 1 °C) (03/11/22 1502)  Temp Source: Temporal (03/11/22 1502)  Respirations: 16 (03/11/22 1502)  Height: 5' 4" (162 6 cm) (03/08/22 1637)  Weight - Scale: 64 1 kg (141 lb 5 oz) (03/08/22 1637)  SpO2: 99 % (03/11/22 1502)  Exam:   Physical Exam  Constitutional:       General: He is not in acute distress  Appearance: He is normal weight  HENT:      Head: Normocephalic and atraumatic  Mouth/Throat:      Mouth: Mucous membranes are moist    Eyes:      Pupils: Pupils are equal, round, and reactive to light     Cardiovascular: Rate and Rhythm: Normal rate and regular rhythm  Pulmonary:      Effort: Pulmonary effort is normal    Abdominal:      General: Abdomen is flat  Musculoskeletal:         General: Normal range of motion  Cervical back: Normal range of motion  Skin:     General: Skin is warm  Neurological:      General: No focal deficit present  Mental Status: He is alert and oriented to person, place, and time  Motor: Motor function is intact  No tremor  Gait: Gait is intact  Psychiatric:         Mood and Affect: Mood normal          Behavior: Behavior normal          Thought Content: Thought content normal          Judgment: Judgment normal      Discharge instructions/Information to patient and family:   See after visit summary for information provided to patient and family  Provisions for Follow-Up Care:  See after visit summary for information related to follow-up care and any pertinent home health orders  Disposition:     Home       Discharge Statement:  I spent 35 minutes discharging the patient  This time was spent on the day of discharge  I had direct contact with the patient on the day of discharge  Greater than 50% of the total time was spent examining patient, answering all patient questions, arranging and discussing plan of care with patient as well as directly providing post-discharge instructions  Additional time then spent on discharge activities  Discharge Medications:  See after visit summary for reconciled discharge medications provided to patient and family        ** Please Note: This note has been constructed using a voice recognition system **

## 2022-03-14 NOTE — UTILIZATION REVIEW
Notification of Discharge   This is a Notification of Discharge from our facility [address]  Please be advised that this patient has been discharge from our facility  Below you will find the admission and discharge date and time including the patients disposition  UTILIZATION REVIEW CONTACT:  Mihaela Claire MA  Utilization   Network Utilization Review Department  Phone: [phone] x carefully listen to the prompts  All voicemails are confidential   Email: [email or web address]     PHYSICIAN ADVISORY SERVICES:  FOR [id number] REVIEW - MEDICAL NECESSITY DENIAL  Phone: [phone]  Fax: [phone]  Email: [email or web address]     PRESENTATION DATE: 3/8/2022  4:25 PM  OBERVATION ADMISSION DATE:   INPATIENT ADMISSION DATE: 3/8/22  4:25 PM   DISCHARGE DATE: 3/11/2022  7:23 PM  DISPOSITION: Home/Self Care Home/Self Care      IMPORTANT INFORMATION:  Send all requests for admission clinical reviews, approved or denied determinations and any other requests to dedicated fax number below belonging to the campus where the patient is receiving treatment   List of dedicated fax numbers:  [address] DENIALS (Administrative/Medical Necessity) [phone]   [address] (Maternity/NICU/Pediatrics) [phone]   [facility] [phone]   130 [facility] [phone]   [address] [place] [address] [phone]   2000 [facility] [address] [place] [address],[address] [place] [address] [phone]   [facility]  [phone]   [address] [facility] [address]  24[address] [facility] 1000 [address] [facility] [phone]

## 2022-03-25 ENCOUNTER — HOSPITAL ENCOUNTER (EMERGENCY)
Facility: HOSPITAL | Age: 32
Discharge: HOME | End: 2022-03-26
Attending: EMERGENCY MEDICINE
Payer: COMMERCIAL

## 2022-03-25 DIAGNOSIS — F10.920 ACUTE ALCOHOLIC INTOXICATION WITHOUT COMPLICATION (HCC): Primary | ICD-10-CM

## 2022-03-25 LAB — ETHANOL EXG-MCNC: 0.32 MG/DL

## 2022-03-25 PROCEDURE — 99284 EMERGENCY DEPT VISIT MOD MDM: CPT | Performed by: EMERGENCY MEDICINE

## 2022-03-25 PROCEDURE — 82075 ASSAY OF BREATH ETHANOL: CPT | Performed by: EMERGENCY MEDICINE

## 2022-03-25 PROCEDURE — 99284 EMERGENCY DEPT VISIT MOD MDM: CPT

## 2022-03-25 PROCEDURE — 93005 ELECTROCARDIOGRAM TRACING: CPT

## 2022-03-25 RX ORDER — LANOLIN ALCOHOL/MO/W.PET/CERES
100 CREAM (GRAM) TOPICAL DAILY
Status: DISCONTINUED | OUTPATIENT
Start: 2022-03-25 | End: 2022-03-26 | Stop reason: HOSPADM

## 2022-03-25 RX ORDER — FOLIC ACID 1 MG/1
1 TABLET ORAL DAILY
Status: DISCONTINUED | OUTPATIENT
Start: 2022-03-25 | End: 2022-03-26 | Stop reason: HOSPADM

## 2022-03-25 RX ORDER — LORAZEPAM 1 MG/1
2 TABLET ORAL ONCE AS NEEDED
Status: COMPLETED | OUTPATIENT
Start: 2022-03-25 | End: 2022-03-25

## 2022-03-25 RX ADMIN — Medication 1 TABLET: at 21:35

## 2022-03-25 RX ADMIN — FOLIC ACID 1 MG: 1 TABLET ORAL at 21:35

## 2022-03-25 RX ADMIN — THIAMINE HCL TAB 100 MG 100 MG: 100 TAB at 21:35

## 2022-03-25 RX ADMIN — LORAZEPAM 2 MG: 1 TABLET ORAL at 21:35

## 2022-03-26 VITALS
HEART RATE: 102 BPM | DIASTOLIC BLOOD PRESSURE: 70 MMHG | OXYGEN SATURATION: 95 % | RESPIRATION RATE: 21 BRPM | TEMPERATURE: 98.5 F | SYSTOLIC BLOOD PRESSURE: 135 MMHG

## 2022-03-26 LAB
ATRIAL RATE: 106 BPM
ETHANOL EXG-MCNC: 0.2 MG/DL
ETHANOL EXG-MCNC: 0.24 MG/DL
P AXIS: 41 DEGREES
PR INTERVAL: 140 MS
QRS AXIS: 33 DEGREES
QRSD INTERVAL: 88 MS
QT INTERVAL: 336 MS
QTC INTERVAL: 446 MS
T WAVE AXIS: 19 DEGREES
VENTRICULAR RATE: 106 BPM

## 2022-03-26 PROCEDURE — 93010 ELECTROCARDIOGRAM REPORT: CPT | Performed by: INTERNAL MEDICINE

## 2022-03-26 PROCEDURE — 82075 ASSAY OF BREATH ETHANOL: CPT | Performed by: EMERGENCY MEDICINE

## 2022-03-26 NOTE — ED NOTES
This writer contacted [facility] and spoke to [place] again at the nursing desk  Notified her of patients   204 ZACH Ortiz requested his discharge documentation be faxed to [phone]  Documentation was fax  Will follow up with Diana for patient pickup time from the recovery center       Meron Collins RN  03/26/22 4296

## 2022-03-26 NOTE — ED NOTES
[facility] to [place]  Diana informed me that the patient's BAT must be  224 or lower to be accepted  Advised her the patient's BAT  237  Provider notified  Per verbal order, provider requested a repeat BAT in one hour  Will continue to monitor       Deidra Pozo RN  03/26/22 [address], RN  03/26/22 2471

## 2022-03-26 NOTE — ED PROVIDER NOTES
Pt Name: Cindy Wilde  MRN: [id number]  [place] 1990  Age/Sex: 29 y o  male  Date of evaluation: 3/25/2022  PCP: No primary care provider on file  CHIEF COMPLAINT    Chief Complaint   Patient presents with    Alcohol Intoxication     Patient was at [facility] recovery for help and his BAT was to high  HPI     29 y o  male presenting with alcohol intoxication  Patient states he typically drinks 1 L of vodka per day, last drink was this morning  He states he suffers from alcoholism, attempted checked into a rehab is found to have a blood alcohol level that was too high further admission criteria  He was sent here for further evaluation and screening  He denies fever, nausea, vomiting, diarrhea, chest pain, shortness of breath, headache, numbness, weakness, changes in speech or vision, trauma, other symptoms  Patient does have a history of alcohol withdrawal as well as seizures in due to same  He denies suicidal or homicidal ideation or auditory visual hallucinations at this time  HPI      Past Medical and Surgical History    Past Medical History:   Diagnosis Date    Abnormal serum level of lipase 3/8/2022    [facility] 3/8/2022    Alcoholism ([facility] )     Delirium tremens ([facility] ) 3/8/2022    Hyperkalemia 3/9/2022    Hypertension     Hyponatremia 3/9/2022    Psychiatric disorder        History reviewed  No pertinent surgical history  History reviewed  No pertinent family history  Social History     Tobacco Use    Smoking status: Never Smoker    Smokeless tobacco: Never Used   Substance Use Topics    Alcohol use: Yes     Comment: 1 gallon of vodka per day    Drug use: Not Currently     Types: Heroin           Allergies    No Known Allergies    Home Medications    Prior to Admission medications    Medication Sig Start Date End Date Taking?  Authorizing Provider   gabapentin (Neurontin) 300 mg capsule Take 1 capsule (300 mg total) by mouth 3 (three) times a day for 7 days 3/11/22 3/18/22  Myrna Quintana DO   naltrexone (REVIA) 50 mg tablet Take 1 tablet (50 mg total) by mouth daily 3/11/22 4/10/22  Teresa Beaver DO           Review of Systems    Review of Systems   Constitutional: Negative for appetite change, chills and diaphoresis  HENT: Negative for drooling, facial swelling, trouble swallowing and voice change  Respiratory: Negative for apnea, shortness of breath and wheezing  Cardiovascular: Negative for chest pain and leg swelling  Gastrointestinal: Negative for abdominal distention, abdominal pain, diarrhea, nausea and vomiting  Genitourinary: Negative for dysuria and urgency  Musculoskeletal: Negative for arthralgias, back pain, gait problem and neck pain  Skin: Negative for color change, rash and wound  Neurological: Negative for seizures, speech difficulty, weakness and headaches  Psychiatric/Behavioral: Positive for confusion  Negative for agitation, behavioral problems and dysphoric mood  The patient is not nervous/anxious  All other systems reviewed and negative  Physical Exam      ED Triage Vitals   Temperature Pulse Respirations Blood Pressure SpO2   03/25/22 2109 03/25/22 2109 03/25/22 2109 03/25/22 2109 03/25/22 2109   98 5 °F (36 9 °C) (!) 123 19 140/83 96 %      Temp Source Heart Rate Source Patient Position - Orthostatic VS BP Location FiO2 (%)   03/25/22 2109 03/25/22 2109 03/25/22 2109 03/25/22 2109 --   Oral Monitor Lying Right arm       Pain Score       03/25/22 2200       No Pain               Physical Exam  Vitals and nursing note reviewed  Constitutional:       General: He is not in acute distress  Appearance: He is well-developed  He is not ill-appearing or toxic-appearing  HENT:      Head: Normocephalic and atraumatic        Right Ear: External ear normal       Left Ear: External ear normal    Eyes:      Conjunctiva/sclera: Conjunctivae normal       Pupils: Pupils are equal, round, and reactive to light  Neck:      Trachea: No tracheal deviation  Cardiovascular:      Rate and Rhythm: Regular rhythm  Tachycardia present  Heart sounds: Normal heart sounds  No murmur heard  Pulmonary:      Effort: Pulmonary effort is normal  No respiratory distress  Breath sounds: Normal breath sounds  No stridor  No wheezing or rales  Abdominal:      General: There is no distension  Palpations: Abdomen is soft  Tenderness: There is no abdominal tenderness  There is no guarding or rebound  Musculoskeletal:         General: No deformity  Normal range of motion  Cervical back: Normal range of motion and neck supple  Skin:     General: Skin is warm and dry  Findings: No rash  Neurological:      Mental Status: He is alert and oriented to person, place, and time  Cranial Nerves: No cranial nerve deficit  Sensory: No sensory deficit  Motor: No weakness  Coordination: Coordination normal    Psychiatric:         Behavior: Behavior normal          Thought Content: Thought content normal          Judgment: Judgment normal       Comments: Anxious appearing              Diagnostic Results    EKG Interpretation    Rate:  106  BPM  Rhythm:  Sinus tachycardia  Axis:  Normal   Intervals: Normal, no blocks, QTc  446 ms  Q waves:  No pathologic Q waves   T waves:  Normal   ST segments:  No significant elevations or depressions     Impression:  Sinus tachycardia without evidence of acute ischemia or significant arrhythmia      EKG for comparison:  EKG dated 8 March 2022 similar in character with no major changes    EKG interpreted by me       Labs:    Results Reviewed     Procedure Component Value Units Date/Time    POCT alcohol breath test [[id number]]  (Normal) Resulted: 03/26/22 0208    Lab Status: Final result Updated: 03/26/22 0208     EXTBreath Alcohol 0 204    POCT alcohol breath test [[id number]]  (Normal) Resulted: 03/26/22 0103    Lab Status: Final result Updated: 03/26/22 0104     EXTBreath Alcohol 0 237    POCT alcohol breath test [[id number]]  (Normal) Resulted: 03/25/22 2121    Lab Status: Final result Updated: 03/25/22 2121     EXTBreath Alcohol 0 317          All labs reviewed and utilized in the medical decision making process    Radiology:    No orders to display       All radiology studies independently viewed by me and interpreted by the radiologist     Procedure    Procedures        ED Course of Care and Re-Assessments      Patient given multivitamins as well as lorazepam as he is anxious appearing and tachycardic and there is some concern for possible early alcohol withdrawal     Patient resting comfortably after the Ativan  On repeat breath alcohol test, number lowered below stated threshold, patient medically cleared for return to alcohol rehab facility  Medications   thiamine tablet 100 mg (100 mg Oral Given 3/35/24 0679)   folic acid (FOLVITE) tablet 1 mg (1 mg Oral Given 3/25/22 2135)   multivitamin-minerals (CENTRUM) tablet 1 tablet (1 tablet Oral Given 3/25/22 2135)   LORazepam (ATIVAN) tablet 2 mg (2 mg Oral Given 3/25/22 2135)           FINAL IMPRESSION    Final diagnoses:   Acute alcoholic intoxication without complication ([facility] )         DISPOSITION/PLAN    Presentation as above with acute alcohol intoxication  Vital signs remarkable for tachycardia improved with treatment in ER, examination nonspecific  Very low suspicion for sepsis, meningitis, encephalitis, delirium tremens  Alcohol withdrawal felt to be relatively unlikely based on breath alcohol test and stated last drink this morning, suspect initial shaking his secondary anxiety but based on diagnostic uncertainty given Ativan during observation in Emergency department  Patient medically cleared for return to alcohol rehab center, discharged strict return precautions    Time reflects when diagnosis was documented in both MDM as applicable and the Disposition within this note     Time User Action Codes Description Comment    3/26/2022  2:11 AM [place] ESTER Add [A44 014] Acute alcoholic intoxication without complication [facility])       ED Disposition     ED Disposition Condition Date/Time Comment    Discharge Stable Sat Mar 26, 2022  2:11 AM Jamir Reed discharge to home/self care  Follow-up Information     Follow up With Specialties Details Why Contact Info Additional Information    [address] [facility] Emergency Department Emergency Medicine Go to  If symptoms worsen [address] [place] [id number]  [address] [facility] Emergency Department, [address] [place], [place], [facility] today To begin your  alcohol rehabilitation  PATIENT REFERRED TO:    [address] [facility] Emergency Department  [address] [facility] [address] [phone]  Go to   If symptoms worsen    [facility] today  To begin your  alcohol rehabilitation  DISCHARGE MEDICATIONS:    Patient's Medications   Discharge Prescriptions    No medications on file       No discharge procedures on file  Hector Mills MD    Portions of the record may have been created with voice recognition software  Occasional wrong word or "sound alike" substitutions may have occurred due to the inherent limitations of voice recognition software    Please read the chart carefully and recognize, using context, where substitutions have occurred     Hector Mills MD  03/26/22 6058

## 2022-04-26 ENCOUNTER — APPOINTMENT (EMERGENCY)
Dept: CT IMAGING | Facility: HOSPITAL | Age: 32
End: 2022-04-26
Payer: COMMERCIAL

## 2022-04-26 ENCOUNTER — HOSPITAL ENCOUNTER (EMERGENCY)
Facility: HOSPITAL | Age: 32
Discharge: HOME | End: 2022-04-27
Attending: EMERGENCY MEDICINE | Admitting: EMERGENCY MEDICINE
Payer: COMMERCIAL

## 2022-04-26 DIAGNOSIS — F10.10 ALCOHOL ABUSE: Primary | ICD-10-CM

## 2022-04-26 LAB
ALBUMIN SERPL BCP-MCNC: 3.7 G/DL (ref 3.5–5)
ALP SERPL-CCNC: 72 U/L (ref 46–116)
ALT SERPL W P-5'-P-CCNC: 72 U/L (ref 12–78)
ANION GAP SERPL CALCULATED.3IONS-SCNC: 13 MMOL/L (ref 4–13)
AST SERPL W P-5'-P-CCNC: 78 U/L (ref 5–45)
BASOPHILS # BLD AUTO: 0.06 THOUSANDS/ΜL (ref 0–0.1)
BASOPHILS NFR BLD AUTO: 1 % (ref 0–1)
BILIRUB SERPL-MCNC: 0.21 MG/DL (ref 0.2–1)
BUN SERPL-MCNC: 9 MG/DL (ref 5–25)
CALCIUM SERPL-MCNC: 8.7 MG/DL (ref 8.3–10.1)
CHLORIDE SERPL-SCNC: 108 MMOL/L (ref 100–108)
CO2 SERPL-SCNC: 26 MMOL/L (ref 21–32)
CREAT SERPL-MCNC: 0.82 MG/DL (ref 0.6–1.3)
EOSINOPHIL # BLD AUTO: 0.27 THOUSAND/ΜL (ref 0–0.61)
EOSINOPHIL NFR BLD AUTO: 3 % (ref 0–6)
ERYTHROCYTE [DISTWIDTH] IN BLOOD BY AUTOMATED COUNT: 13 % (ref 11.6–15.1)
ETHANOL SERPL-MCNC: 317 MG/DL (ref 0–3)
GFR SERPL CREATININE-BSD FRML MDRD: 118 ML/MIN/1.73SQ M
GLUCOSE SERPL-MCNC: 122 MG/DL (ref 65–140)
HCT VFR BLD AUTO: 36.6 % (ref 36.5–49.3)
HGB BLD-MCNC: 12.1 G/DL (ref 12–17)
IMM GRANULOCYTES # BLD AUTO: 0.03 THOUSAND/UL (ref 0–0.2)
IMM GRANULOCYTES NFR BLD AUTO: 0 % (ref 0–2)
LYMPHOCYTES # BLD AUTO: 2.13 THOUSANDS/ΜL (ref 0.6–4.47)
LYMPHOCYTES NFR BLD AUTO: 24 % (ref 14–44)
MCH RBC QN AUTO: 31 PG (ref 26.8–34.3)
MCHC RBC AUTO-ENTMCNC: 33.1 G/DL (ref 31.4–37.4)
MCV RBC AUTO: 94 FL (ref 82–98)
MONOCYTES # BLD AUTO: 0.87 THOUSAND/ΜL (ref 0.17–1.22)
MONOCYTES NFR BLD AUTO: 10 % (ref 4–12)
NEUTROPHILS # BLD AUTO: 5.37 THOUSANDS/ΜL (ref 1.85–7.62)
NEUTS SEG NFR BLD AUTO: 62 % (ref 43–75)
NRBC BLD AUTO-RTO: 0 /100 WBCS
PLATELET # BLD AUTO: 308 THOUSANDS/UL (ref 149–390)
PMV BLD AUTO: 9.2 FL (ref 8.9–12.7)
POTASSIUM SERPL-SCNC: 3.5 MMOL/L (ref 3.5–5.3)
PROT SERPL-MCNC: 7.3 G/DL (ref 6.4–8.2)
RBC # BLD AUTO: 3.9 MILLION/UL (ref 3.88–5.62)
SODIUM SERPL-SCNC: 147 MMOL/L (ref 136–145)
WBC # BLD AUTO: 8.73 THOUSAND/UL (ref 4.31–10.16)

## 2022-04-26 PROCEDURE — 99285 EMERGENCY DEPT VISIT HI MDM: CPT | Performed by: EMERGENCY MEDICINE

## 2022-04-26 PROCEDURE — 80053 COMPREHEN METABOLIC PANEL: CPT | Performed by: EMERGENCY MEDICINE

## 2022-04-26 PROCEDURE — 82077 ASSAY SPEC XCP UR&BREATH IA: CPT | Performed by: EMERGENCY MEDICINE

## 2022-04-26 PROCEDURE — 36415 COLL VENOUS BLD VENIPUNCTURE: CPT | Performed by: EMERGENCY MEDICINE

## 2022-04-26 PROCEDURE — 96375 TX/PRO/DX INJ NEW DRUG ADDON: CPT

## 2022-04-26 PROCEDURE — 70486 CT MAXILLOFACIAL W/O DYE: CPT

## 2022-04-26 PROCEDURE — 70450 CT HEAD/BRAIN W/O DYE: CPT

## 2022-04-26 PROCEDURE — 99285 EMERGENCY DEPT VISIT HI MDM: CPT

## 2022-04-26 PROCEDURE — 96365 THER/PROPH/DIAG IV INF INIT: CPT

## 2022-04-26 PROCEDURE — 85025 COMPLETE CBC W/AUTO DIFF WBC: CPT | Performed by: EMERGENCY MEDICINE

## 2022-04-26 RX ORDER — MIDAZOLAM HYDROCHLORIDE 1 MG/ML
1 INJECTION INTRAMUSCULAR; INTRAVENOUS ONCE
Status: COMPLETED | OUTPATIENT
Start: 2022-04-26 | End: 2022-04-26

## 2022-04-26 RX ORDER — LORAZEPAM 2 MG/ML
2 INJECTION INTRAMUSCULAR ONCE
Status: COMPLETED | OUTPATIENT
Start: 2022-04-26 | End: 2022-04-26

## 2022-04-26 RX ADMIN — LORAZEPAM 2 MG: 2 INJECTION INTRAMUSCULAR; INTRAVENOUS at 15:22

## 2022-04-26 RX ADMIN — SODIUM CHLORIDE, SODIUM LACTATE, POTASSIUM CHLORIDE, AND CALCIUM CHLORIDE 1000 ML: .6; .31; .03; .02 INJECTION, SOLUTION INTRAVENOUS at 15:22

## 2022-04-26 NOTE — ED NOTES
Father called to get update on son, explained unfortunately do not have permission to give out information as he is not on the chart and son is sleeping  pts father understood but did voice his concern and that he was in touch with [facility] rehab where he was and states that the do have a open spot for him to come back this information was relayed to provider        Solo Smith RN  04/26/22 1182

## 2022-04-26 NOTE — ED NOTES
Patient currently restless  Denies any SI/HI  Patient cooperative with staff       Anne Marie Toure  04/26/22 6078

## 2022-04-26 NOTE — ED NOTES
Frantz with [employer] called and stated the 302 was denied  She said Patient's father stated [facility] called and stated they would be willing to take Patient back if he was willing once medically cleared    Crisis to f/u
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
## 2022-04-26 NOTE — ED NOTES
Pt is uncontrollably all over the bed with uncontrolled movements denies using drugs or meth but family states they are worried since he got out of rehab  Pt used to drink half gallon a day of vodka but states drank a little bit last night  Has a red rash all over body states has been there for a couple days        David Awad RN  04/26/22 1623
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
## 2022-04-27 VITALS
HEIGHT: 64 IN | TEMPERATURE: 97.2 F | DIASTOLIC BLOOD PRESSURE: 70 MMHG | RESPIRATION RATE: 20 BRPM | SYSTOLIC BLOOD PRESSURE: 135 MMHG | BODY MASS INDEX: 25.61 KG/M2 | HEART RATE: 89 BPM | WEIGHT: 150 LBS | OXYGEN SATURATION: 96 %

## 2022-04-27 LAB
AMPHETAMINES SERPL QL SCN: NEGATIVE
BARBITURATES UR QL: NEGATIVE
BENZODIAZ UR QL: POSITIVE
BILIRUB UR QL STRIP: NEGATIVE
CLARITY UR: CLEAR
COCAINE UR QL: NEGATIVE
COLOR UR: YELLOW
ETHANOL EXG-MCNC: 0.12 MG/DL
GLUCOSE UR STRIP-MCNC: NEGATIVE MG/DL
HGB UR QL STRIP.AUTO: NEGATIVE
KETONES UR STRIP-MCNC: NEGATIVE MG/DL
LEUKOCYTE ESTERASE UR QL STRIP: NEGATIVE
METHADONE UR QL: NEGATIVE
NITRITE UR QL STRIP: NEGATIVE
OPIATES UR QL SCN: NEGATIVE
OXYCODONE+OXYMORPHONE UR QL SCN: NEGATIVE
PCP UR QL: NEGATIVE
PH UR STRIP.AUTO: 5.5 [PH]
PROT UR STRIP-MCNC: NEGATIVE MG/DL
SP GR UR STRIP.AUTO: >=1.03 (ref 1–1.03)
THC UR QL: NEGATIVE
UROBILINOGEN UR QL STRIP.AUTO: 0.2 E.U./DL

## 2022-04-27 PROCEDURE — 81003 URINALYSIS AUTO W/O SCOPE: CPT | Performed by: EMERGENCY MEDICINE

## 2022-04-27 PROCEDURE — 80307 DRUG TEST PRSMV CHEM ANLYZR: CPT | Performed by: EMERGENCY MEDICINE

## 2022-04-27 PROCEDURE — 82075 ASSAY OF BREATH ETHANOL: CPT | Performed by: EMERGENCY MEDICINE

## 2022-04-27 RX ORDER — DIAZEPAM 5 MG/1
5 TABLET ORAL ONCE
Status: COMPLETED | OUTPATIENT
Start: 2022-04-27 | End: 2022-04-27

## 2022-04-27 RX ORDER — LORAZEPAM 0.5 MG/1
0.5 TABLET ORAL ONCE
Status: DISCONTINUED | OUTPATIENT
Start: 2022-04-27 | End: 2022-04-27

## 2022-04-27 RX ORDER — LORAZEPAM 0.5 MG/1
0.5 TABLET ORAL ONCE
Status: COMPLETED | OUTPATIENT
Start: 2022-04-27 | End: 2022-04-27

## 2022-04-27 RX ADMIN — DIAZEPAM 5 MG: 5 TABLET ORAL at 05:11

## 2022-04-27 RX ADMIN — LORAZEPAM 0.5 MG: 0.5 TABLET ORAL at 00:23

## 2022-04-27 NOTE — ED NOTES
Patient using patient phone at this time attempting to find ride home        Madie Severs, RN  04/27/22 5568

## 2022-04-27 NOTE — ED NOTES
Third contact/call made to Pt's parent(# on file) with no answer  Message left on answering service provided  Re: pt is D/C to home and needs transportation            Nivia Deluca RN  04/27/22 3023

## 2022-04-27 NOTE — ED NOTES
Kayla 47 crisis contacted and made aware of update/need for consult        Carol Hassan RN  04/27/22 8743

## 2022-04-27 NOTE — ED NOTES
POCT Alcohol breath test result   117, ER Provider made aware       Anika Ceballos RN  04/27/22 238 John Tobar , RN  04/27/22 0752

## 2022-04-27 NOTE — ED NOTES
Patients father called back stating that he will not come get patient  Patient is to find another way to get home       Olga Peters  04/27/22 3836

## 2022-04-27 NOTE — ED NOTES
Pt observed sleeping with eys closed, resps easy nonlabored on continuous O2 @ 2L/m via n/c       Linda Albert RN  04/27/22 0826

## 2022-04-27 NOTE — ED NOTES
Per consuelo from [employer] crisis, patient safe to be d/c home  States he has a psych f/u appointment in [place] [address] coming up  She will fax assessment to us  If patient has a ride home he is able to leave, if not he must stay until clinically sober        Franklin Johnson, RN  04/27/22 0022

## 2022-04-27 NOTE — ED NOTES
Patient states he will walk home and provider states he is ok with patient walking  Pt is alert and oriented and stable at this time  Discharge papers provided and IV removed        Claudine Ambrosio, RN  04/27/22 1002

## 2022-04-27 NOTE — ED NOTES
Pt is AAOx4, calm and cooperative with care, no reports of acute pain or discomfort at present  PO fluids offered and given, refused PO food/snack at this time        Winston Jimenez RN  04/27/22 0028

## 2022-04-27 NOTE — ED NOTES
Second contact/call made to Pt's parent(# on file) with no answer  Message left on answering service provided  Re: pt is D/C to home and needs transportation       John Godoy RN  04/27/22 0883

## 2022-04-27 NOTE — ED NOTES
Patient speaking with Estee Carlos from [employer] crisis via telephone at this time        Vertell Bumpers, RN  04/27/22 0117

## 2022-05-03 ENCOUNTER — APPOINTMENT (EMERGENCY)
Dept: CT IMAGING | Facility: HOSPITAL | Age: 32
End: 2022-05-03
Payer: COMMERCIAL

## 2022-05-03 ENCOUNTER — APPOINTMENT (EMERGENCY)
Dept: RADIOLOGY | Facility: HOSPITAL | Age: 32
End: 2022-05-03
Payer: COMMERCIAL

## 2022-05-03 ENCOUNTER — HOSPITAL ENCOUNTER (EMERGENCY)
Facility: HOSPITAL | Age: 32
Discharge: HOME | End: 2022-05-03
Attending: EMERGENCY MEDICINE
Payer: COMMERCIAL

## 2022-05-03 VITALS
SYSTOLIC BLOOD PRESSURE: 148 MMHG | OXYGEN SATURATION: 93 % | RESPIRATION RATE: 18 BRPM | WEIGHT: 135 LBS | BODY MASS INDEX: 23.17 KG/M2 | TEMPERATURE: 98.6 F | HEART RATE: 126 BPM | DIASTOLIC BLOOD PRESSURE: 79 MMHG

## 2022-05-03 DIAGNOSIS — S01.91XA LACERATION OF HEAD: ICD-10-CM
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
## 2022-05-03 LAB
ABO GROUP BLD: NORMAL
ABO GROUP BLD: NORMAL
ANION GAP SERPL CALCULATED.3IONS-SCNC: 13 MMOL/L (ref 4–13)
APTT PPP: 25 SECONDS (ref 23–37)
BASOPHILS # BLD AUTO: 0.05 THOUSANDS/ΜL (ref 0–0.1)
BASOPHILS NFR BLD AUTO: 1 % (ref 0–1)
BLD GP AB SCN SERPL QL: NEGATIVE
BUN SERPL-MCNC: 18 MG/DL (ref 5–25)
CALCIUM SERPL-MCNC: 9.2 MG/DL (ref 8.4–10.2)
CHLORIDE SERPL-SCNC: 101 MMOL/L (ref 96–108)
CO2 SERPL-SCNC: 27 MMOL/L (ref 21–32)
CREAT SERPL-MCNC: 0.66 MG/DL (ref 0.6–1.3)
EOSINOPHIL # BLD AUTO: 0.32 THOUSAND/ΜL (ref 0–0.61)
EOSINOPHIL NFR BLD AUTO: 4 % (ref 0–6)
ERYTHROCYTE [DISTWIDTH] IN BLOOD BY AUTOMATED COUNT: 13.6 % (ref 11.6–15.1)
ETHANOL SERPL-MCNC: 442 MG/DL
GFR SERPL CREATININE-BSD FRML MDRD: 129 ML/MIN/1.73SQ M
GLUCOSE SERPL-MCNC: 115 MG/DL (ref 65–140)
HCT VFR BLD AUTO: 44.4 % (ref 36.5–49.3)
HGB BLD-MCNC: 14.4 G/DL (ref 12–17)
IMM GRANULOCYTES # BLD AUTO: 0.09 THOUSAND/UL (ref 0–0.2)
IMM GRANULOCYTES NFR BLD AUTO: 1 % (ref 0–2)
INR PPP: 0.85 (ref 0.84–1.19)
LYMPHOCYTES # BLD AUTO: 3.06 THOUSANDS/ΜL (ref 0.6–4.47)
LYMPHOCYTES NFR BLD AUTO: 37 % (ref 14–44)
MCH RBC QN AUTO: 31.3 PG (ref 26.8–34.3)
MCHC RBC AUTO-ENTMCNC: 32.4 G/DL (ref 31.4–37.4)
MCV RBC AUTO: 97 FL (ref 82–98)
MONOCYTES # BLD AUTO: 0.52 THOUSAND/ΜL (ref 0.17–1.22)
MONOCYTES NFR BLD AUTO: 6 % (ref 4–12)
NEUTROPHILS # BLD AUTO: 4.3 THOUSANDS/ΜL (ref 1.85–7.62)
NEUTS SEG NFR BLD AUTO: 51 % (ref 43–75)
NRBC BLD AUTO-RTO: 0 /100 WBCS
PLATELET # BLD AUTO: 299 THOUSANDS/UL (ref 149–390)
PMV BLD AUTO: 9.2 FL (ref 8.9–12.7)
POTASSIUM SERPL-SCNC: 4.1 MMOL/L (ref 3.5–5.3)
PROTHROMBIN TIME: 11.5 SECONDS (ref 11.6–14.5)
RBC # BLD AUTO: 4.6 MILLION/UL (ref 3.88–5.62)
RH BLD: POSITIVE
RH BLD: POSITIVE
SODIUM SERPL-SCNC: 141 MMOL/L (ref 135–147)
SPECIMEN EXPIRATION DATE: NORMAL
WBC # BLD AUTO: 8.34 THOUSAND/UL (ref 4.31–10.16)

## 2022-05-03 PROCEDURE — 85730 THROMBOPLASTIN TIME PARTIAL: CPT | Performed by: PHYSICIAN ASSISTANT

## 2022-05-03 PROCEDURE — 71045 X-RAY EXAM CHEST 1 VIEW: CPT

## 2022-05-03 PROCEDURE — 85025 COMPLETE CBC W/AUTO DIFF WBC: CPT | Performed by: PHYSICIAN ASSISTANT

## 2022-05-03 PROCEDURE — 80048 BASIC METABOLIC PNL TOTAL CA: CPT | Performed by: PHYSICIAN ASSISTANT

## 2022-05-03 PROCEDURE — 99284 EMERGENCY DEPT VISIT MOD MDM: CPT

## 2022-05-03 PROCEDURE — 70450 CT HEAD/BRAIN W/O DYE: CPT

## 2022-05-03 PROCEDURE — 74177 CT ABD & PELVIS W/CONTRAST: CPT

## 2022-05-03 PROCEDURE — 96374 THER/PROPH/DIAG INJ IV PUSH: CPT

## 2022-05-03 PROCEDURE — 82077 ASSAY SPEC XCP UR&BREATH IA: CPT | Performed by: PHYSICIAN ASSISTANT

## 2022-05-03 PROCEDURE — 86850 RBC ANTIBODY SCREEN: CPT | Performed by: PHYSICIAN ASSISTANT

## 2022-05-03 PROCEDURE — 86901 BLOOD TYPING SEROLOGIC RH(D): CPT | Performed by: PHYSICIAN ASSISTANT

## 2022-05-03 PROCEDURE — 36415 COLL VENOUS BLD VENIPUNCTURE: CPT | Performed by: PHYSICIAN ASSISTANT

## 2022-05-03 PROCEDURE — 86900 BLOOD TYPING SEROLOGIC ABO: CPT | Performed by: PHYSICIAN ASSISTANT

## 2022-05-03 PROCEDURE — 72125 CT NECK SPINE W/O DYE: CPT

## 2022-05-03 PROCEDURE — 99284 EMERGENCY DEPT VISIT MOD MDM: CPT | Performed by: PHYSICIAN ASSISTANT

## 2022-05-03 PROCEDURE — 85610 PROTHROMBIN TIME: CPT | Performed by: PHYSICIAN ASSISTANT

## 2022-05-03 PROCEDURE — 71260 CT THORAX DX C+: CPT

## 2022-05-03 PROCEDURE — 90715 TDAP VACCINE 7 YRS/> IM: CPT | Performed by: PHYSICIAN ASSISTANT

## 2022-05-03 PROCEDURE — 90471 IMMUNIZATION ADMIN: CPT

## 2022-05-03 PROCEDURE — 70486 CT MAXILLOFACIAL W/O DYE: CPT

## 2022-05-03 RX ORDER — LORAZEPAM 2 MG/ML
1 INJECTION INTRAMUSCULAR ONCE
Status: COMPLETED | OUTPATIENT
Start: 2022-05-03 | End: 2022-05-03

## 2022-05-03 RX ADMIN — LORAZEPAM 1 MG: 2 INJECTION INTRAMUSCULAR; INTRAVENOUS at 16:27

## 2022-05-03 RX ADMIN — TETANUS TOXOID, REDUCED DIPHTHERIA TOXOID AND ACELLULAR PERTUSSIS VACCINE, ADSORBED 0.5 ML: 5; 2.5; 8; 8; 2.5 SUSPENSION INTRAMUSCULAR at 14:58

## 2022-05-03 RX ADMIN — IOHEXOL 100 ML: 350 INJECTION, SOLUTION INTRAVENOUS at 14:39

## 2022-05-03 RX ADMIN — SODIUM CHLORIDE 1000 ML: 0.9 INJECTION, SOLUTION INTRAVENOUS at 16:25
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
## 2022-05-18 NOTE — QUICK NOTE
Review of Systems   HENT: Negative for dental problem, facial swelling, hearing loss and tinnitus  Eyes: Negative for pain and visual disturbance  Respiratory: Negative for chest tightness and shortness of breath  Cardiovascular: Negative for chest pain  Gastrointestinal: Negative for abdominal pain  Musculoskeletal: Positive for neck pain  Skin: Positive for wound  Neurological: Negative for dizziness and headaches  All other systems reviewed and are negative